# Patient Record
Sex: FEMALE | Race: BLACK OR AFRICAN AMERICAN | NOT HISPANIC OR LATINO | Employment: FULL TIME | URBAN - METROPOLITAN AREA
[De-identification: names, ages, dates, MRNs, and addresses within clinical notes are randomized per-mention and may not be internally consistent; named-entity substitution may affect disease eponyms.]

---

## 2020-05-04 ENCOUNTER — TELEPHONE (OUTPATIENT)
Dept: OBSTETRICS AND GYNECOLOGY | Facility: CLINIC | Age: 30
End: 2020-05-04

## 2020-05-04 NOTE — TELEPHONE ENCOUNTER
----- Message from Juliane Sutton sent at 5/4/2020  8:58 AM CDT -----  Contact: Patient   .Type:  Needs Medical Advice    Who Called:  Patient   Symptoms (please be specific):  Burning when urine    How long has patient had these symptoms:    Pharmacy name and phone #:    Would the patient rather a call back or a response via [facility]? call  Best Call Back Number:  [phone]  Additional Information: n/a

## 2020-05-04 NOTE — TELEPHONE ENCOUNTER
Patient called to report burning with urination and inflammation/irritation at the urethra. Took Macrobid in March for a UTI. Would like a Rx or appt. SH

## 2020-05-05 ENCOUNTER — OFFICE VISIT (OUTPATIENT)
Dept: OBSTETRICS AND GYNECOLOGY | Facility: CLINIC | Age: 30
End: 2020-05-05
Payer: COMMERCIAL

## 2020-05-05 VITALS
WEIGHT: 168 LBS | SYSTOLIC BLOOD PRESSURE: 111 MMHG | BODY MASS INDEX: 28.68 KG/M2 | DIASTOLIC BLOOD PRESSURE: 77 MMHG | HEIGHT: 64 IN

## 2020-05-05 DIAGNOSIS — B37.31 YEAST VAGINITIS: ICD-10-CM

## 2020-05-05 DIAGNOSIS — R30.0 DYSURIA: Primary | ICD-10-CM

## 2020-05-05 DIAGNOSIS — R39.15 URGENCY OF URINATION: ICD-10-CM

## 2020-05-05 DIAGNOSIS — A60.09 HERPES GENITALIS IN WOMEN: ICD-10-CM

## 2020-05-05 DIAGNOSIS — N30.01 ACUTE CYSTITIS WITH HEMATURIA: ICD-10-CM

## 2020-05-05 PROBLEM — R35.0 URINARY FREQUENCY: Status: ACTIVE | Noted: 2020-05-05

## 2020-05-05 PROCEDURE — 99213 PR OFFICE/OUTPT VISIT, EST, LEVL III, 20-29 MIN: ICD-10-PCS | Mod: S$GLB,,, | Performed by: OBSTETRICS & GYNECOLOGY

## 2020-05-05 PROCEDURE — 3008F BODY MASS INDEX DOCD: CPT | Mod: CPTII,S$GLB,, | Performed by: OBSTETRICS & GYNECOLOGY

## 2020-05-05 PROCEDURE — 3008F PR BODY MASS INDEX (BMI) DOCUMENTED: ICD-10-PCS | Mod: CPTII,S$GLB,, | Performed by: OBSTETRICS & GYNECOLOGY

## 2020-05-05 PROCEDURE — 99213 OFFICE O/P EST LOW 20 MIN: CPT | Mod: S$GLB,,, | Performed by: OBSTETRICS & GYNECOLOGY

## 2020-05-05 RX ORDER — VALACYCLOVIR HYDROCHLORIDE 500 MG/1
500 TABLET, FILM COATED ORAL 2 TIMES DAILY
COMMUNITY
Start: 2020-03-31 | End: 2022-02-03 | Stop reason: SDUPTHER

## 2020-05-05 RX ORDER — FLUCONAZOLE 150 MG/1
150 TABLET ORAL EVERY OTHER DAY
Qty: 2 TABLET | Refills: 0 | Status: SHIPPED | OUTPATIENT
Start: 2020-05-05 | End: 2020-05-08

## 2020-05-05 RX ORDER — NITROFURANTOIN 25; 75 MG/1; MG/1
100 CAPSULE ORAL 2 TIMES DAILY
Qty: 14 CAPSULE | Refills: 0 | Status: SHIPPED | OUTPATIENT
Start: 2020-05-05 | End: 2020-05-12

## 2020-05-05 RX ORDER — PHENAZOPYRIDINE HYDROCHLORIDE 200 MG/1
200 TABLET, FILM COATED ORAL 3 TIMES DAILY PRN
Qty: 20 TABLET | Refills: 0 | Status: SHIPPED | OUTPATIENT
Start: 2020-05-05 | End: 2020-08-10

## 2020-05-05 NOTE — PROGRESS NOTES
Subjective:       Patient ID: April Feldman is a 29 y.o. female.    Chief Complaint:  Urinary Tract Infection? Yeast? Herpes? (with unusual feeling at vaginal opening) and Pelvic Pain    History of Present Illness:  Patient c/o burning, stinging type pain x 2-3 days that keeps getting worse and is not sure if it is yeast or a herpes outbreak or her bladder.  Now with some dysuria but unable to tell if it is because urine hitting something externally that burns or burning on the way out.  Does report urinary frequency and urgency that kept her up most of last night and when she urinates, she only goes a small amount. Has not noticed any vaginal discharge but does notice that even when she is not urinating, there still is a strange uncomfortable sensationshe is feeling that is hard to describe. No recent illnesses or changes in medications. Only takes valtrex prn and has not need in a while. Also takes showers only    Past Medical History:   Diagnosis Date    Anxiety     Depression     Diabetes mellitus     Herpes simplex virus (HSV) infection     genital    PCOS (polycystic ovarian syndrome)     Pre-diabetes     Sickle cell anemia     Sickle cell trait     Tobacco abuse        Past Surgical History:   Procedure Laterality Date     SECTION         Social History     Tobacco Use    Smoking status: Current Every Day Smoker     Types: Cigars   Substance Use Topics    Alcohol use: Yes     Frequency: 2-3 times a week     Drinks per session: 1 or 2     Binge frequency: Never    Drug use: Never       Family History   Problem Relation Age of Onset    Colon cancer Paternal Grandfather     Lung cancer Paternal Grandmother     Diabetes Paternal Grandmother     Pancreatic cancer Maternal Grandmother     Diabetes Maternal Grandmother     Diabetes Father     Sickle cell anemia Mother        Review of patient's allergies indicates:   Allergen Reactions    Pcn [penicillins]          Current  Outpatient Medications:     fluconazole (DIFLUCAN) 150 MG Tab, Take 1 tablet (150 mg total) by mouth every other day. for 2 doses, Disp: 2 tablet, Rfl: 0    nitrofurantoin, macrocrystal-monohydrate, (MACROBID) 100 MG capsule, Take 1 capsule (100 mg total) by mouth 2 (two) times daily. for 7 days, Disp: 14 capsule, Rfl: 0    phenazopyridine (PYRIDIUM) 200 MG tablet, Take 1 tablet (200 mg total) by mouth 3 (three) times daily as needed for Pain., Disp: 20 tablet, Rfl: 0    valACYclovir (VALTREX) 500 MG tablet, Take 500 mg by mouth 2 (two) times daily., Disp: , Rfl:     GYN & OB History  Patient's last menstrual period was 04/15/2020 (exact date).   Date of Last Pap: 3/6/2020-neg  Hx of STDs: no  Hx of abn paps: no    CONTRACEPTION: declines    VITALS:  Vitals:    20 1500   BP: 111/77     Body mass index is 28.84 kg/m².  OB History    Para Term  AB Living   1 1 1         SAB TAB Ectopic Multiple Live Births                  # Outcome Date GA Lbr Tanvir/2nd Weight Sex Delivery Anes PTL Lv   1 Term      CS-Unspec          Review of Systems  Review of Systems   Constitutional: Negative for activity change, appetite change, chills, fatigue, fever and unexpected weight change.   Respiratory: Negative for cough and shortness of breath.    Gastrointestinal: Positive for bloating. Negative for constipation, diarrhea, nausea and vomiting.   Genitourinary: Positive for dysuria, frequency, urgency and vaginal pain. Negative for bladder incontinence, decreased libido, dysmenorrhea, dyspareunia, flank pain, genital sores, hematuria, menorrhagia, menstrual problem, pelvic pain, vaginal bleeding, vaginal discharge, urinary incontinence, postcoital bleeding, postmenopausal bleeding, vaginal dryness and vaginal odor.   Musculoskeletal: Negative for arthralgias and leg pain.   Integumentary:  Negative for rash, acne and mole/lesion.   Neurological: Negative for headaches.   Hematological: Does not bruise/bleed  easily.   Psychiatric/Behavioral: Negative for depression. The patient is not nervous/anxious.    Breast: negative.            Objective:    Physical Exam:   Constitutional: She is oriented to person, place, and time. She appears well-developed and well-nourished. No distress.    HENT:   Head: Normocephalic and atraumatic.      Cardiovascular: Normal rate.     Pulmonary/Chest: Effort normal. No respiratory distress.        Abdominal: Soft. She exhibits no distension. There is no tenderness.     Genitourinary: Rectal exam shows no external hemorrhoid. Pelvic exam was performed with patient supine. Labial bartholins abnormal.There is no rash, tenderness, lesion or injury on the right labia. There is no rash, tenderness, lesion or injury on the left labia. Cervix is normal. There is erythema and tenderness in the vagina. No bleeding in the vagina. Vaginal discharge found. Labial bartholins normal.          Musculoskeletal: Moves all extremeties.      Lymphadenopathy:        Right: No inguinal adenopathy present.        Left: No inguinal adenopathy present.    Neurological: She is alert and oriented to person, place, and time.    Skin: Skin is warm and dry. No rash noted. She is not diaphoretic. No pallor.         *female chaperone present for exam    Assessment:        1. Dysuria    2. Acute cystitis with hematuria    3. Urgency of urination    4. Yeast vaginitis    5. Herpes genitalis in women-no obvious lesions but cant r/o early signs of outbreak      Dysuria  -     Urinalysis; Future; Expected date: 05/05/2020  -     Urine culture; Future; Expected date: 05/05/2020    Acute cystitis with hematuria  -     nitrofurantoin, macrocrystal-monohydrate, (MACROBID) 100 MG capsule; Take 1 capsule (100 mg total) by mouth 2 (two) times daily. for 7 days  Dispense: 14 capsule; Refill: 0    Urgency of urination  -     phenazopyridine (PYRIDIUM) 200 MG tablet; Take 1 tablet (200 mg total) by mouth 3 (three) times daily as needed  for Pain.  Dispense: 20 tablet; Refill: 0    Yeast vaginitis  -     fluconazole (DIFLUCAN) 150 MG Tab; Take 1 tablet (150 mg total) by mouth every other day. for 2 doses  Dispense: 2 tablet; Refill: 0    Herpes genitalis in women-   - no apparent lesions at this time and discussed with patient the option of starting bid valtrex x 3 days if she feels like an outbreak is starting          Plan:       Follow up if symptoms worsen or fail to improve.

## 2020-05-05 NOTE — TELEPHONE ENCOUNTER
----- Message from Michael Almanza sent at 5/5/2020  8:16 AM CDT -----  Contact: self [phone]  .Type:  Needs Medical Advice    Who Called: April Acosta  Symptoms (please be specific):vaginal inflammation/pain with urination   How long has patient had these symptoms:  3 days  Pharmacy name and phone #:    Would the patient rather a call back or a response via [facility]? Call back  Best Call Back Number: [phone]  Additional Information:

## 2020-05-06 LAB
AMORPH URATE CRY URNS QL MICRO: NEGATIVE
BACTERIA #/AREA URNS HPF: ABNORMAL /[HPF]
BILIRUB UR QL STRIP: NEGATIVE
CLARITY UR: ABNORMAL
COLOR UR: YELLOW
EPITHELIAL CELLS: ABNORMAL
GLUCOSE (UA): NEGATIVE MG/DL
KETONES UR QL STRIP: NEGATIVE MG/DL
LEUKOCYTE ESTERASE UR QL STRIP: ABNORMAL
MUCOUS THREADS URNS QL MICRO: NEGATIVE
NITRITE UR QL STRIP: NEGATIVE
OCCULT BLOOD: ABNORMAL
PH, URINE: 5 (ref 5–7.5)
PROT UR QL STRIP: NEGATIVE MG/DL
RBC/HPF: ABNORMAL
SP GR UR STRIP: 1.02 (ref 1–1.03)
UROBILINOGEN, URINE: NORMAL E.U./DL (ref 0–1)
WBC/HPF: ABNORMAL

## 2020-05-19 DIAGNOSIS — B37.31 YEAST VAGINITIS: Primary | ICD-10-CM

## 2020-05-19 RX ORDER — FLUCONAZOLE 150 MG/1
150 TABLET ORAL EVERY OTHER DAY
Qty: 2 TABLET | Refills: 0 | Status: SHIPPED | OUTPATIENT
Start: 2020-05-19 | End: 2020-05-22

## 2020-08-07 ENCOUNTER — TELEPHONE (OUTPATIENT)
Dept: OBSTETRICS AND GYNECOLOGY | Facility: CLINIC | Age: 30
End: 2020-08-07

## 2020-08-07 DIAGNOSIS — R30.0 DYSURIA: Primary | ICD-10-CM

## 2020-08-07 DIAGNOSIS — N30.01 ACUTE CYSTITIS WITH HEMATURIA: ICD-10-CM

## 2020-08-07 RX ORDER — MONTELUKAST SODIUM 10 MG/1
10 TABLET ORAL NIGHTLY
COMMUNITY
End: 2020-08-10

## 2020-08-07 RX ORDER — NITROFURANTOIN 25; 75 MG/1; MG/1
100 CAPSULE ORAL 2 TIMES DAILY
Qty: 14 CAPSULE | Refills: 0 | Status: SHIPPED | OUTPATIENT
Start: 2020-08-07 | End: 2020-08-14

## 2020-08-07 NOTE — TELEPHONE ENCOUNTER
----- Message from Susan Maher sent at 8/7/2020 10:58 AM CDT -----  Regarding: patient advice  Contact: patient  Type:  Needs Medical Advice    Who Called: patient  Symptoms (please be specific): painful urination, possible yeast infection, spotting   How long has patient had these symptoms:  about 2 days  Pharmacy name and phone #:    [facility] [address] [facility]  [address] [facility] [address]  Phone: [phone] Fax: [phone]  Would the patient rather a call back or a response via [facility]? Call back  Best Call Back Number: [phone]  Additional Information: n/a

## 2020-08-07 NOTE — TELEPHONE ENCOUNTER
Please let patient know I am sending out abx for what sounds like a uti and if she gets that urine specimen to the lab, that will be helpful.  However, if she has a vaginal discharge, that probably needs to be evaluated in the office. A yeast infection can have burning but is not normally clear or having an odor.  Please have her come for swab sometime next week

## 2020-08-07 NOTE — TELEPHONE ENCOUNTER
"Pt called c/o dysuria "feels like knives". Also vaginal odor with a clear liquid discharge. Orders for u/a and urine culture sent to [facility]. PCN allergy, uses [facility] on [address]. Fwd to Dr. Lopez for review. Pt.verb.understanding.  "

## 2020-08-07 NOTE — TELEPHONE ENCOUNTER
Spoke with patient. Two pt identifiers confirmed. Notified patient of Dr. Lopez's rec that she come in to evaluate her vag.discharge. apt.scheduled 8/10/2020. Dr. Lopez sent out an abx for her uti. Patient verbalized understanding.

## 2020-08-09 NOTE — PROGRESS NOTES
"CC: PROBLEM VISIT-    HPI:  Patient is a 30 y.o.   who presents for a problem visit today.  History reviewed with patient.      She was seen previously in may 2020 for what was a uti/yeast infection but no hsv lesions seen. Pt treated with macrobid/diflucan and had valtrex to take if she chose.    She then called 3 days ago with dysuria symptoms and a vag d/c w/odor.  She did a urine cx which grew Ecoli but sensitivities pending. She started on Macrobid and pyridium but needs eval today for her vaginal d/c    Her uti is much improved but still has some white/clear vaginal discharge off and on and it has an odor and it a little irritating. She denies any itching or thick yeast like discharge.  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        Past Medical History:   Diagnosis Date    Anxiety and depression     HSV-2 infection     genital    PCOS (polycystic ovarian syndrome)     Pre-diabetes     Sickle cell trait     Tobacco abuse        Past Surgical History:   Procedure Laterality Date     SECTION         Social History     Tobacco Use    Smoking status: Current Every Day Smoker     Types: Cigars    Smokeless tobacco: Never Used   Substance Use Topics    Alcohol use: Yes     Alcohol/week: 5.0 standard drinks     Types: 5 Glasses of wine per week     Frequency: 2-3 times a week     Drinks per session: 1 or 2     Binge frequency: Never     Comment: weekly    Drug use: Never       Family History   Problem Relation Age of Onset    Colon cancer Paternal Grandfather     Lung cancer Paternal Grandmother     Diabetes Paternal Grandmother     Pancreatic cancer Maternal Grandmother     Diabetes Maternal Grandmother     Diabetes Father     Sickle cell trait Father     Sickle cell anemia Mother     Breast cancer Other         great great  maternal aunt    Lupus Maternal Aunt  "       Review of patient's allergies indicates:   Allergen Reactions    Pcn [penicillins]          Current Outpatient Medications:     nitrofurantoin, macrocrystal-monohydrate, (MACROBID) 100 MG capsule, Take 1 capsule (100 mg total) by mouth 2 (two) times daily. for 7 days, Disp: 14 capsule, Rfl: 0    phenazopyridine (PYRIDIUM) 200 MG tablet, Take 200 mg by mouth 3 (three) times daily as needed for Pain., Disp: , Rfl:     valACYclovir (VALTREX) 500 MG tablet, Take 500 mg by mouth 2 (two) times daily., Disp: , Rfl:     OB History        1    Para   1    Term   1            AB        Living   1       SAB        TAB        Ectopic        Multiple        Live Births   1                  GYN HX:    HX ABNL PAPS:  no abnormals    HX OF STDS:  genital herpes    GARDASIL: no- declines     CONTRACEPTION:  none- declines      HEALTH MAINTENANCE:  (PCP-Robinson Melgoza MD)    LAST ANNUAL/ PAP:   2020       LAST PAP:  neg     ROS:  Review of Systems   Constitutional: Negative for activity change, chills, fatigue, fever and unexpected weight change.   Respiratory: Negative for cough and shortness of breath.    Cardiovascular: Negative for chest pain and leg swelling.   Gastrointestinal: Negative for abdominal pain, bloating, blood in stool, constipation, diarrhea, nausea and vomiting.   Endocrine: Negative for hair loss and hot flashes.   Genitourinary: Positive for dysuria (much improved), frequency (much improved), urgency (much improved), vaginal discharge and vaginal odor.   Musculoskeletal: Negative for arthralgias and joint swelling.   Integumentary:  Negative for rash, acne and mole/lesion.   Neurological: Negative for headaches.   Hematological: Does not bruise/bleed easily.   Psychiatric/Behavioral: Negative for depression and sleep disturbance.   Breast: negative.        VITALS:  Patient's last menstrual period was 07/10/2020 (exact date).  Vitals:    08/10/20 0924   BP: (!) 104/55   Pulse:  "103   Weight: 77.6 kg (171 lb)   Height: 5' 4" (1.626 m)     Body mass index is 29.35 kg/m².     PHYSICAL EXAM:  Physical Exam:   Constitutional: She is oriented to person, place, and time. She appears well-developed and well-nourished. She is cooperative.       Cardiovascular: Normal rate.     Pulmonary/Chest: Effort normal. No respiratory distress.        Abdominal: Soft. Normal appearance. She exhibits no distension. There is no abdominal tenderness.     Genitourinary:    Uterus normal.      Pelvic exam was performed with patient supine.   There is no rash, tenderness or lesion on the right labia. There is no rash, tenderness or lesion on the left labia. Uterus is not enlarged and not tender. Cervix is normal. Right adnexum displays no mass, no tenderness and no fullness. Left adnexum displays no mass, no tenderness and no fullness. No erythema, tenderness, bleeding, rectocele, cystocele or unspecified prolapse of vaginal walls in the vagina.    No foreign body in the vagina.      No signs of injury in the vagina.   Labial bartholins normal.Cervix exhibits no motion tenderness, no discharge and no friability. positive for vaginal discharge          Musculoskeletal: Moves all extremeties.       Neurological: She is alert and oriented to person, place, and time.    Skin: Skin is warm and dry. No rash noted.    Psychiatric: She has a normal mood and affect. Her speech is normal and behavior is normal. Judgment and thought content normal.     *female chaperone present for exam    ASSESSMENT and PLAN:  Acute vaginitis-One swab done and will call with results     Acute cystitis without hematuria-finish all antibiotics      FOLLOWUP:  Follow up for wwe.     COUNSELING:  Patient was counseled today on A.C.S. Pap guidelines and recommendations for yearly pelvic exam, monthly self breast exams, contraception, and annual mammograms,starting at age 40. Encouraged patient to see her PCP for other health maintenance.       "

## 2020-08-10 ENCOUNTER — OFFICE VISIT (OUTPATIENT)
Dept: OBSTETRICS AND GYNECOLOGY | Facility: CLINIC | Age: 30
End: 2020-08-10
Payer: COMMERCIAL

## 2020-08-10 VITALS
HEART RATE: 103 BPM | WEIGHT: 171 LBS | HEIGHT: 64 IN | BODY MASS INDEX: 29.19 KG/M2 | SYSTOLIC BLOOD PRESSURE: 104 MMHG | DIASTOLIC BLOOD PRESSURE: 55 MMHG

## 2020-08-10 DIAGNOSIS — N76.0 ACUTE VAGINITIS: Primary | ICD-10-CM

## 2020-08-10 DIAGNOSIS — N30.00 ACUTE CYSTITIS WITHOUT HEMATURIA: ICD-10-CM

## 2020-08-10 LAB
AMORPH URATE CRY URNS QL MICRO: NEGATIVE
BACTERIA #/AREA URNS HPF: NEGATIVE /[HPF]
BILIRUB UR QL STRIP: NEGATIVE
CLARITY UR: ABNORMAL
COLOR UR: YELLOW
EPITHELIAL CELLS: ABNORMAL
GLUCOSE (UA): NEGATIVE MG/DL
KETONES UR QL STRIP: NEGATIVE MG/DL
LEUKOCYTE ESTERASE UR QL STRIP: ABNORMAL
MUCOUS THREADS URNS QL MICRO: ABNORMAL
NITRITE UR QL STRIP: NEGATIVE
OCCULT BLOOD: ABNORMAL
PH, URINE: 6.5 (ref 5–7.5)
PROT UR QL STRIP: 15 MG/DL
RBC/HPF: ABNORMAL
SP GR UR STRIP: 1.01 (ref 1–1.03)
URINE CULTURE, ROUTINE: NORMAL
UROBILINOGEN, URINE: 1 E.U./DL (ref 0–1)
WBC/HPF: ABNORMAL

## 2020-08-10 PROCEDURE — 3008F BODY MASS INDEX DOCD: CPT | Mod: CPTII,S$GLB,, | Performed by: OBSTETRICS & GYNECOLOGY

## 2020-08-10 PROCEDURE — 99213 PR OFFICE/OUTPT VISIT, EST, LEVL III, 20-29 MIN: ICD-10-PCS | Mod: S$GLB,,, | Performed by: OBSTETRICS & GYNECOLOGY

## 2020-08-10 PROCEDURE — 3008F PR BODY MASS INDEX (BMI) DOCUMENTED: ICD-10-PCS | Mod: CPTII,S$GLB,, | Performed by: OBSTETRICS & GYNECOLOGY

## 2020-08-10 PROCEDURE — 99213 OFFICE O/P EST LOW 20 MIN: CPT | Mod: S$GLB,,, | Performed by: OBSTETRICS & GYNECOLOGY

## 2020-08-10 RX ORDER — PHENAZOPYRIDINE HYDROCHLORIDE 200 MG/1
200 TABLET, FILM COATED ORAL 3 TIMES DAILY PRN
COMMUNITY
End: 2021-03-10

## 2020-08-12 NOTE — PROGRESS NOTES
Please let patient know her urine cx grew out E coli but it is sensitive to the macrobid she is taking. Make sure she finishes all 7 days. Are her symptoms better?

## 2020-08-13 ENCOUNTER — TELEPHONE (OUTPATIENT)
Dept: OBSTETRICS AND GYNECOLOGY | Facility: CLINIC | Age: 30
End: 2020-08-13

## 2020-08-13 NOTE — TELEPHONE ENCOUNTER
Spoke with patient. Two pt identifiers confirmed. Notified patient of results of urine culture, and be sure to finish all 7 days of abx. Pt. States symptoms are better. Advised if they don't completely resolve to call office. Patient verbalized understanding.

## 2020-08-13 NOTE — TELEPHONE ENCOUNTER
----- Message from Nicolle Lopez MD sent at 8/12/2020  1:38 PM CDT -----  Please let patient know her urine cx grew out E coli but it is sensitive to the macrobid she is taking. Make sure she finishes all 7 days. Are her symptoms better?

## 2020-09-18 ENCOUNTER — TELEPHONE (OUTPATIENT)
Dept: OBSTETRICS AND GYNECOLOGY | Facility: CLINIC | Age: 30
End: 2020-09-18

## 2020-09-18 NOTE — TELEPHONE ENCOUNTER
Please call  Pt and tell her that the oneswab we did on 8/10/20- we have a verbal report on and is pos for GBBS. So if she is still symptomatic, I can send out 5 days of Levaquin for her but  Let me know what pharmacy she wants

## 2020-09-21 ENCOUNTER — TELEPHONE (OUTPATIENT)
Dept: OBSTETRICS AND GYNECOLOGY | Facility: CLINIC | Age: 30
End: 2020-09-21

## 2020-09-21 DIAGNOSIS — N76.0 ACUTE VAGINITIS: Primary | ICD-10-CM

## 2020-09-21 RX ORDER — LEVOFLOXACIN 500 MG/1
500 TABLET, FILM COATED ORAL DAILY
Qty: 5 TABLET | Refills: 0 | Status: SHIPPED | OUTPATIENT
Start: 2020-09-21 | End: 2020-09-26

## 2020-09-21 NOTE — TELEPHONE ENCOUNTER
Spoke with patient. Two pt identifiers confirmed. Notified patient of results of cultures. Pt. States no symptoms but does want rx of abx. PCN allergy and wants to use [facility] in [place] on [address] [facility]. [facility] Dr. Lopez will send rx. Patient verbalized understanding.

## 2020-09-21 NOTE — TELEPHONE ENCOUNTER
----- Message from Susan Maher sent at 9/18/2020  1:23 PM CDT -----  Regarding: patient returning a call  Contact: patient  Type:  Patient Returning Call    Who Called:patient  Who Left Message for Patient:nurse  Does the patient know what this is regarding?:yes,results  Would the patient rather a call back or a response via [facility]? Call back  Best Call Back Number:[phone]   Additional Information: n/a

## 2021-03-10 PROBLEM — R35.0 URINARY FREQUENCY: Status: RESOLVED | Noted: 2020-05-05 | Resolved: 2021-03-10

## 2021-03-10 PROBLEM — N30.01 ACUTE CYSTITIS WITH HEMATURIA: Status: RESOLVED | Noted: 2020-05-05 | Resolved: 2021-03-10

## 2021-03-10 PROBLEM — B37.31 YEAST VAGINITIS: Status: RESOLVED | Noted: 2020-05-05 | Resolved: 2021-03-10

## 2021-03-10 PROBLEM — R30.0 DYSURIA: Status: RESOLVED | Noted: 2020-05-05 | Resolved: 2021-03-10

## 2021-03-10 PROBLEM — R39.15 URGENCY OF URINATION: Status: RESOLVED | Noted: 2020-05-05 | Resolved: 2021-03-10

## 2021-03-11 ENCOUNTER — OFFICE VISIT (OUTPATIENT)
Dept: OBSTETRICS AND GYNECOLOGY | Facility: CLINIC | Age: 31
End: 2021-03-11
Payer: COMMERCIAL

## 2021-03-11 ENCOUNTER — PROCEDURE VISIT (OUTPATIENT)
Dept: OBSTETRICS AND GYNECOLOGY | Facility: CLINIC | Age: 31
End: 2021-03-11
Payer: COMMERCIAL

## 2021-03-11 VITALS
DIASTOLIC BLOOD PRESSURE: 67 MMHG | SYSTOLIC BLOOD PRESSURE: 99 MMHG | BODY MASS INDEX: 28.24 KG/M2 | HEART RATE: 81 BPM | WEIGHT: 165.38 LBS | HEIGHT: 64 IN

## 2021-03-11 DIAGNOSIS — R10.2 PAIN, PELVIC, FEMALE: Primary | ICD-10-CM

## 2021-03-11 DIAGNOSIS — R10.2 PAIN, PELVIC, FEMALE: ICD-10-CM

## 2021-03-11 DIAGNOSIS — R10.2 PELVIC PAIN: ICD-10-CM

## 2021-03-11 DIAGNOSIS — Z01.419 ENCOUNTER FOR WELL WOMAN EXAM WITH ROUTINE GYNECOLOGICAL EXAM: Primary | ICD-10-CM

## 2021-03-11 DIAGNOSIS — N92.6 IRREGULAR PERIODS: ICD-10-CM

## 2021-03-11 PROCEDURE — 99213 PR OFFICE/OUTPT VISIT, EST, LEVL III, 20-29 MIN: ICD-10-PCS | Mod: 25,S$GLB,, | Performed by: OBSTETRICS & GYNECOLOGY

## 2021-03-11 PROCEDURE — 76830 TRANSVAGINAL US NON-OB: CPT | Mod: S$GLB,,, | Performed by: OBSTETRICS & GYNECOLOGY

## 2021-03-11 PROCEDURE — 1126F PR PAIN SEVERITY QUANTIFIED, NO PAIN PRESENT: ICD-10-PCS | Mod: S$GLB,,, | Performed by: OBSTETRICS & GYNECOLOGY

## 2021-03-11 PROCEDURE — 3008F BODY MASS INDEX DOCD: CPT | Mod: CPTII,S$GLB,, | Performed by: OBSTETRICS & GYNECOLOGY

## 2021-03-11 PROCEDURE — 76830 PR  ECHOGRAPHY,TRANSVAGINAL: ICD-10-PCS | Mod: S$GLB,,, | Performed by: OBSTETRICS & GYNECOLOGY

## 2021-03-11 PROCEDURE — 99213 OFFICE O/P EST LOW 20 MIN: CPT | Mod: 25,S$GLB,, | Performed by: OBSTETRICS & GYNECOLOGY

## 2021-03-11 PROCEDURE — 99395 PREV VISIT EST AGE 18-39: CPT | Mod: S$GLB,,, | Performed by: OBSTETRICS & GYNECOLOGY

## 2021-03-11 PROCEDURE — 99395 PR PREVENTIVE VISIT,EST,18-39: ICD-10-PCS | Mod: S$GLB,,, | Performed by: OBSTETRICS & GYNECOLOGY

## 2021-03-11 PROCEDURE — 3008F PR BODY MASS INDEX (BMI) DOCUMENTED: ICD-10-PCS | Mod: CPTII,S$GLB,, | Performed by: OBSTETRICS & GYNECOLOGY

## 2021-03-11 PROCEDURE — 1126F AMNT PAIN NOTED NONE PRSNT: CPT | Mod: S$GLB,,, | Performed by: OBSTETRICS & GYNECOLOGY

## 2021-03-12 ENCOUNTER — TELEPHONE (OUTPATIENT)
Dept: OBSTETRICS AND GYNECOLOGY | Facility: CLINIC | Age: 31
End: 2021-03-12

## 2021-03-22 ENCOUNTER — TELEPHONE (OUTPATIENT)
Dept: OBSTETRICS AND GYNECOLOGY | Facility: CLINIC | Age: 31
End: 2021-03-22

## 2021-06-29 ENCOUNTER — OFFICE VISIT (OUTPATIENT)
Dept: OBSTETRICS AND GYNECOLOGY | Facility: CLINIC | Age: 31
End: 2021-06-29
Payer: COMMERCIAL

## 2021-06-29 VITALS
HEART RATE: 90 BPM | HEIGHT: 64 IN | WEIGHT: 172 LBS | DIASTOLIC BLOOD PRESSURE: 80 MMHG | BODY MASS INDEX: 29.37 KG/M2 | SYSTOLIC BLOOD PRESSURE: 119 MMHG

## 2021-06-29 DIAGNOSIS — E28.2 PCOS (POLYCYSTIC OVARIAN SYNDROME): ICD-10-CM

## 2021-06-29 DIAGNOSIS — R10.2 PELVIC PAIN: ICD-10-CM

## 2021-06-29 DIAGNOSIS — N92.6 MENSES, IRREGULAR: Primary | ICD-10-CM

## 2021-06-29 DIAGNOSIS — B37.31 YEAST VAGINITIS: ICD-10-CM

## 2021-06-29 PROCEDURE — 99214 PR OFFICE/OUTPT VISIT, EST, LEVL IV, 30-39 MIN: ICD-10-PCS | Mod: S$GLB,,, | Performed by: OBSTETRICS & GYNECOLOGY

## 2021-06-29 PROCEDURE — 3008F BODY MASS INDEX DOCD: CPT | Mod: CPTII,S$GLB,, | Performed by: OBSTETRICS & GYNECOLOGY

## 2021-06-29 PROCEDURE — 99214 OFFICE O/P EST MOD 30 MIN: CPT | Mod: S$GLB,,, | Performed by: OBSTETRICS & GYNECOLOGY

## 2021-06-29 PROCEDURE — 3008F PR BODY MASS INDEX (BMI) DOCUMENTED: ICD-10-PCS | Mod: CPTII,S$GLB,, | Performed by: OBSTETRICS & GYNECOLOGY

## 2021-06-29 RX ORDER — FLUCONAZOLE 150 MG/1
150 TABLET ORAL EVERY OTHER DAY
Qty: 2 TABLET | Refills: 0 | Status: SHIPPED | OUTPATIENT
Start: 2021-06-29 | End: 2021-07-02

## 2021-06-30 ENCOUNTER — PROCEDURE VISIT (OUTPATIENT)
Dept: OBSTETRICS AND GYNECOLOGY | Facility: CLINIC | Age: 31
End: 2021-06-30
Payer: COMMERCIAL

## 2021-06-30 DIAGNOSIS — R10.2 PELVIC PAIN: ICD-10-CM

## 2021-06-30 LAB
ABS NRBC COUNT: 0 X 10 3/UL (ref 0–0.01)
ABSOLUTE BASOPHIL: 0.04 X 10 3/UL (ref 0–0.22)
ABSOLUTE EOSINOPHIL: 0.19 X 10 3/UL (ref 0.04–0.54)
ABSOLUTE IMMATURE GRAN: 0.02 X 10 3/UL (ref 0–0.04)
ABSOLUTE LYMPHOCYTE: 1.83 X 10 3/UL (ref 0.86–4.75)
ABSOLUTE MONOCYTE: 0.51 X 10 3/UL (ref 0.22–1.08)
ALBUMIN SERPL-MCNC: 4.3 G/DL (ref 3.5–5.2)
ALBUMIN/GLOB SERPL ELPH: 1.6 {RATIO} (ref 1–2.7)
ALP ISOS SERPL LEV INH-CCNC: 72 U/L (ref 35–105)
ALT (SGPT): 18 U/L (ref 0–33)
ANION GAP SERPL CALC-SCNC: 6 MMOL/L (ref 8–17)
AST SERPL-CCNC: 21 U/L (ref 0–32)
B-HCG SERPL-ACNC: <1.59 MIU/ML
BASOPHILS NFR BLD: 0.8 % (ref 0.2–1.2)
BILIRUBIN, TOTAL: 0.3 MG/DL (ref 0–1.2)
BUN/CREAT SERPL: 13.6 (ref 6–20)
CALCIUM SERPL-MCNC: 9.7 MG/DL (ref 8.6–10.2)
CARBON DIOXIDE, CO2: 29 MMOL/L (ref 22–29)
CHLORIDE: 106 MMOL/L (ref 98–107)
CREAT SERPL-MCNC: 0.81 MG/DL (ref 0.5–0.9)
DHEA SULFATE: 483 UG/DL (ref 98.8–340)
EOSINOPHIL NFR BLD: 3.8 % (ref 0.7–7)
ESTIMATED AVERAGE GLUCOSE: 120 MG/DL
FREE TESTOSTERONE: 1.93 NG/DL (ref 0–1)
FSH: 5.42 MIU/ML
GFR ESTIMATION: 83.02
GLOBULIN: 2.7 G/DL (ref 1.5–4.5)
GLUCOSE: 120 MG/DL (ref 74–106)
HBA1C MFR BLD: 5.8 % (ref 4–6)
HCT VFR BLD AUTO: 40.7 % (ref 37–47)
HGB BLD-MCNC: 14.1 G/DL (ref 12–16)
IMMATURE GRANULOCYTES: 0.4 % (ref 0–0.5)
INSULIN AB SER QL: 20.4 UIU/ML (ref 2.6–24.9)
LH: 10.7 MIU/ML
LYMPHOCYTES NFR BLD: 36.3 % (ref 19.3–53.1)
MCH RBC QN AUTO: 32.3 PG (ref 27–32)
MCHC RBC AUTO-ENTMCNC: 34.6 G/DL (ref 32–36)
MCV RBC AUTO: 93.3 FL (ref 82–100)
MONOCYTES NFR BLD: 10.1 % (ref 4.7–12.5)
NEUTROPHILS # BLD AUTO: 2.45 X 10 3/UL (ref 2.15–7.56)
NEUTROPHILS NFR BLD: 48.6 %
NUCLEATED RED BLOOD CELLS: 0 /100 WBC (ref 0–0.2)
PLATELET # BLD AUTO: 315 X 10 3/UL (ref 135–400)
POTASSIUM: 4.7 MMOL/L (ref 3.5–5.1)
PROLACTIN SERPL-MCNC: 9.41 NG/ML (ref 4.79–23.3)
PROT SNV-MCNC: 7 G/DL (ref 6.4–8.3)
RBC # BLD AUTO: 4.36 X 10 6/UL (ref 4.2–5.4)
RDW-SD: 46.7 FL (ref 37–54)
SODIUM: 141 MMOL/L (ref 136–145)
T4, FREE: 1.38 NG/DL (ref 0.93–1.7)
TSH SERPL DL<=0.005 MIU/L-ACNC: 1.37 UIU/ML (ref 0.27–4.2)
UREA NITROGEN (BUN): 11 MG/DL (ref 6–20)
WBC # BLD: 5.04 X 10 3/UL (ref 4.3–10.8)

## 2021-06-30 PROCEDURE — 76856 PR  ECHO,PELVIC (NONOBSTETRIC): ICD-10-PCS | Mod: S$GLB,,, | Performed by: OBSTETRICS & GYNECOLOGY

## 2021-06-30 PROCEDURE — 76856 US EXAM PELVIC COMPLETE: CPT | Mod: S$GLB,,, | Performed by: OBSTETRICS & GYNECOLOGY

## 2021-07-02 ENCOUNTER — TELEPHONE (OUTPATIENT)
Dept: OBSTETRICS AND GYNECOLOGY | Facility: CLINIC | Age: 31
End: 2021-07-02

## 2021-07-02 DIAGNOSIS — R79.89 ELEVATED DEHYDROEPIANDROSTERONE (DHEA) LEVEL: ICD-10-CM

## 2021-07-02 DIAGNOSIS — R79.89 ELEVATED TESTOSTERONE LEVEL IN FEMALE: Primary | ICD-10-CM

## 2021-07-06 ENCOUNTER — TELEPHONE (OUTPATIENT)
Dept: OBSTETRICS AND GYNECOLOGY | Facility: CLINIC | Age: 31
End: 2021-07-06

## 2021-07-06 ENCOUNTER — PATIENT MESSAGE (OUTPATIENT)
Dept: OBSTETRICS AND GYNECOLOGY | Facility: CLINIC | Age: 31
End: 2021-07-06

## 2021-07-08 ENCOUNTER — PATIENT MESSAGE (OUTPATIENT)
Dept: OBSTETRICS AND GYNECOLOGY | Facility: CLINIC | Age: 31
End: 2021-07-08

## 2021-07-08 ENCOUNTER — TELEPHONE (OUTPATIENT)
Dept: OBSTETRICS AND GYNECOLOGY | Facility: CLINIC | Age: 31
End: 2021-07-08

## 2021-07-08 DIAGNOSIS — N76.0 ACUTE VAGINITIS: Primary | ICD-10-CM

## 2021-07-08 RX ORDER — LEVOFLOXACIN 500 MG/1
500 TABLET, FILM COATED ORAL DAILY
Qty: 5 TABLET | Refills: 0 | Status: SHIPPED | OUTPATIENT
Start: 2021-07-08 | End: 2021-07-13

## 2021-12-02 ENCOUNTER — OFFICE VISIT (OUTPATIENT)
Dept: OBSTETRICS AND GYNECOLOGY | Facility: CLINIC | Age: 31
End: 2021-12-02
Payer: MEDICAID

## 2021-12-02 VITALS
HEIGHT: 64 IN | DIASTOLIC BLOOD PRESSURE: 79 MMHG | SYSTOLIC BLOOD PRESSURE: 117 MMHG | BODY MASS INDEX: 29.5 KG/M2 | WEIGHT: 172.81 LBS | HEART RATE: 97 BPM

## 2021-12-02 DIAGNOSIS — L73.9 FOLLICULITIS: Primary | ICD-10-CM

## 2021-12-02 PROBLEM — R79.89 ELEVATED TESTOSTERONE LEVEL IN FEMALE: Status: ACTIVE | Noted: 2021-01-01

## 2021-12-02 PROCEDURE — 99213 OFFICE O/P EST LOW 20 MIN: CPT | Mod: S$GLB,,, | Performed by: OBSTETRICS & GYNECOLOGY

## 2021-12-02 PROCEDURE — 99213 PR OFFICE/OUTPT VISIT, EST, LEVL III, 20-29 MIN: ICD-10-PCS | Mod: S$GLB,,, | Performed by: OBSTETRICS & GYNECOLOGY

## 2021-12-02 RX ORDER — SULFAMETHOXAZOLE AND TRIMETHOPRIM 800; 160 MG/1; MG/1
1 TABLET ORAL 2 TIMES DAILY
Qty: 14 TABLET | Refills: 0 | Status: SHIPPED | OUTPATIENT
Start: 2021-12-02 | End: 2021-12-09

## 2021-12-02 RX ORDER — METFORMIN HYDROCHLORIDE 500 MG/1
TABLET, EXTENDED RELEASE ORAL
COMMUNITY
Start: 2021-11-01 | End: 2023-07-05 | Stop reason: SDUPTHER

## 2022-02-02 ENCOUNTER — PATIENT MESSAGE (OUTPATIENT)
Dept: OBSTETRICS AND GYNECOLOGY | Facility: CLINIC | Age: 32
End: 2022-02-02
Payer: MEDICAID

## 2022-02-03 RX ORDER — VALACYCLOVIR HYDROCHLORIDE 500 MG/1
500 TABLET, FILM COATED ORAL 2 TIMES DAILY
Qty: 10 TABLET | Refills: 3 | Status: SHIPPED | OUTPATIENT
Start: 2022-02-03 | End: 2022-03-16 | Stop reason: SDUPTHER

## 2022-02-28 RX ORDER — FLUCONAZOLE 150 MG/1
150 TABLET ORAL EVERY OTHER DAY
COMMUNITY
Start: 2022-02-28 | End: 2022-02-28 | Stop reason: SDUPTHER

## 2022-02-28 NOTE — TELEPHONE ENCOUNTER
----- Message from Latoya Mcdowell sent at 2/28/2022  8:06 AM CST -----  Regarding: Pt advice  Contact: Pt  Type:  Needs Medical Advice    Who Called:  Yina Feldman   Symptoms (please be specific):  jelly like discharge w/blood, itching and irritation   How long has patient had these symptoms:  last week   Pharmacy name and phone #:       [facility] [address] [place], [place] - [address] [facility]  [address] [facility] [address]  Phone: [phone] Fax: [phone]    Would the patient rather a call back or a response via [facility]? Call back   Best Call Back Number:  [phone]  Additional Information:

## 2022-02-28 NOTE — TELEPHONE ENCOUNTER
Returned call to patient. She reports having vaginal itching, and using a vaginal suppository for yeast. She states is somewhat better, but would like something called out in case it doesn't completely go away. Request will be sent to Dr. Lopez for approval. Patient to contact our office if symptoms worsens or does not improve. Patient verbalized understanding.

## 2022-03-01 RX ORDER — FLUCONAZOLE 150 MG/1
150 TABLET ORAL EVERY OTHER DAY
Qty: 2 TABLET | Refills: 0 | Status: SHIPPED | OUTPATIENT
Start: 2022-03-01 | End: 2022-03-05

## 2022-03-02 ENCOUNTER — TELEPHONE (OUTPATIENT)
Dept: OBSTETRICS AND GYNECOLOGY | Facility: CLINIC | Age: 32
End: 2022-03-02
Payer: MEDICAID

## 2022-03-02 DIAGNOSIS — R30.0 DYSURIA: Primary | ICD-10-CM

## 2022-03-02 RX ORDER — NITROFURANTOIN 25; 75 MG/1; MG/1
100 CAPSULE ORAL
COMMUNITY
End: 2022-03-02 | Stop reason: SDUPTHER

## 2022-03-02 RX ORDER — NITROFURANTOIN 25; 75 MG/1; MG/1
100 CAPSULE ORAL EVERY 12 HOURS
Qty: 14 CAPSULE | Refills: 0 | Status: SHIPPED | OUTPATIENT
Start: 2022-03-02 | End: 2022-03-14

## 2022-03-02 NOTE — TELEPHONE ENCOUNTER
Spoke with patient. Two pt identifiers confirmed. Notified patient she needs to go to [facility] and do a urine and urine cx.will send orders. She has a PCN allergy and uses [facility] on [address]. Patient verbalized understanding.

## 2022-03-02 NOTE — TELEPHONE ENCOUNTER
Spoke with patient. Two pt identifiers confirmed. Notified patient med sent to pharmacy. Patient verbalized understanding.

## 2022-03-02 NOTE — TELEPHONE ENCOUNTER
----- Message from Mali Taveras sent at 3/2/2022 10:31 AM CST -----  April Feldman would like for the doctor to call her out some medication to help with painful urination. Please give her a call back for any additional questions [phone] (home)     [address] - [address] [facility]  [address] [facility] [address]  Phone: [phone] Fax: [phone]

## 2022-03-02 NOTE — TELEPHONE ENCOUNTER
Orders for ua, c&s sent to lab for dysuria. Dr. Lopez out of town but wants me to send a refill request to Dr. Lopez for macrobid 100mg bid x 7 days.

## 2022-03-07 ENCOUNTER — TELEPHONE (OUTPATIENT)
Dept: OBSTETRICS AND GYNECOLOGY | Facility: CLINIC | Age: 32
End: 2022-03-07
Payer: MEDICAID

## 2022-03-07 NOTE — TELEPHONE ENCOUNTER
Please call pt and let her know her ua wasn't a good clean catch and her urine cx just grew nonspecific bacteria likely from contamination from the skin. However we had started her on macrobid and I would recommend finishing all that and if symptoms are nor completely resolve, then let us know and we can recheck

## 2022-03-07 NOTE — TELEPHONE ENCOUNTER
Spoke with patient. Two pt identifiers confirmed. Notified patient of results of urine. Finish abx and call, office if symptoms not resolved. She states she is getting better.. Patient verbalized understanding.

## 2022-03-14 NOTE — PROGRESS NOTES
CC: WELL WOMAN (age 26-39)     Patient Care Team:  Robinson Melgoza MD as PCP - General (Family Medicine)  Cecilia Frederick MD (Endocrinology)    The patient's last visit with me was on 2021 for folliculitis    HPI:  Patient is a 31 y.o. who presents for her well woman exam today.  History reviewed with patient.   Patient is without complaints or concerns today. Had a uti a few weeks ago but symptoms resolved compltely and declines repeat ua.  Has been seeing dr Frederick and doing well on metformin. Periods have become regular every month now. Sees her again next month    Patient's last menstrual period was 2022 (exact date).      CONTRACEPTION: none and declines at this time  GARDASIL: x 2 in   HX ABNL PAPS: none    REVIEW OF DATA/ HEALTH MAINTENANCE  LAST ANNUAL/ PAP:   2021    LAST LABS- in the last year with specialist    Past Medical History:   Diagnosis Date    Anxiety and depression     Elevated DHEA     Elevated testosterone level in female     ref to endocrinology-shante    Folliculitis     History of UTI     HSV-2 infection     genital    Menses, irregular     PCOS (polycystic ovarian syndrome)     Pre-diabetes     Sickle cell trait      SURGICAL HX:   has a past surgical history that includes  section.    SOCIAL HX:   reports that she has been smoking cigars. She has never used smokeless tobacco. She reports current alcohol use of about 4.0 standard drinks of alcohol per week. She reports that she does not use drugs.    FAMILY HX:  family history includes Breast cancer in her other; Colon cancer in her paternal grandfather; Diabetes in her father, maternal grandmother, and paternal grandmother; Lung cancer in her paternal grandmother; Lupus in her maternal aunt; Pancreatic cancer in her maternal grandmother; Sickle cell anemia in her mother; Sickle cell trait in her father. .    ALLERGIES:  Pcn [penicillins]    Current Outpatient Medications:      "metFORMIN (GLUCOPHAGE-XR) 500 MG ER 24hr tablet, , Disp: , Rfl:     valACYclovir (VALTREX) 500 MG tablet, Take 1 tablet (500 mg total) by mouth 2 (two) times daily., Disp: 10 tablet, Rfl: 3    ROS:  CONST:  No fever, chills, fatigue or unexpected changes in weight  CV: No chest pain or palpitations  RESP:  No shortness of breath or cough  GI: No abd pain, vomiting, diarrhea, blood in stool, or changes in bowel mvmts  SKIN: No rashes or lesions  MUSCULOSKELETAL: No joint swelling or pain  PSYCH: No changes in mood or insomia  BREASTS: No asymmetry, lumps, pain, nipple discharge, or skin changes  :  No dysuria, urgency, frequency, hematuria or incontinence          No vag dc, itching, odor or dryness          No pelvic pain, dyspareunia, or abnormal vaginal bleeding    VITALS:  Blood pressure 112/76, pulse 79, height 5' 4" (1.626 m), weight 78.2 kg (172 lb 6.4 oz), last menstrual period 02/12/2022.  Body mass index is 29.59 kg/m².     PHYSICAL EXAM-  APPEARANCE: Well appearing, in no acute distress.  NECK: Neck symmetric without masses or thyromegaly.  CV:  Normal rate  PULM: Normal resp rate, no resp distress, normal resp effort  PSYCH: Normal mood and affect, cooperative  SKIN: No rashes, lesions, or abnormal bruising  LYMPH: No inguinal or axillary adenopathy  ABD: Soft, without tenderness or masses. No palpable hernias or hsm  BREASTS: Symmetrical, no skin changes or lesions. No palpable masses, tenderness, or nipple dc  PELVIC:  VULVA: No lesions. Normal female genitalia.  URETHRAL MEATUS: No masses, no prolapse.  BLADDER/ URETHRA: No masses or suprapubic tenderness  VAGINA: No discharge, erythema, or lesions. No significant cystocele or rectocele.   CVX: No lesions,no cervical motion tenderness   UTERUS: Normal size/shape, mobile, non-tender  ADNEXA: No masses, tenderness, or fullness.  ANUS/ PERINEUM: Normal tone.  No lesions.  *female chaperone present for entire exam    ASSESSMENT and PLAN:  Encounter for " well woman exam with routine gynecological exam  -     Liquid-based pap smear, screening    HSV-2 infection  -     valACYclovir (VALTREX) 500 MG tablet; Take 1 tablet (500 mg total) by mouth 2 (two) times daily.  Dispense: 10 tablet; Refill: 3       FOLLOWUP:  1 yr for wwe or sooner prn    COUNSELING:  Patient was counseled today on recommendation for yearly pelvic exams, current Pap guidelines, self breast exams, contraception, and recommendations for annual screening mammograms at age 40. Encouraged patient to see her PCP for other health maintenance.

## 2022-03-15 ENCOUNTER — OFFICE VISIT (OUTPATIENT)
Dept: OBSTETRICS AND GYNECOLOGY | Facility: CLINIC | Age: 32
End: 2022-03-15
Payer: MEDICAID

## 2022-03-15 VITALS
HEART RATE: 79 BPM | SYSTOLIC BLOOD PRESSURE: 112 MMHG | WEIGHT: 172.38 LBS | DIASTOLIC BLOOD PRESSURE: 76 MMHG | HEIGHT: 64 IN | BODY MASS INDEX: 29.43 KG/M2

## 2022-03-15 DIAGNOSIS — Z01.419 ENCOUNTER FOR WELL WOMAN EXAM WITH ROUTINE GYNECOLOGICAL EXAM: Primary | ICD-10-CM

## 2022-03-15 DIAGNOSIS — B00.9 HSV-2 INFECTION: ICD-10-CM

## 2022-03-15 PROCEDURE — 1159F PR MEDICATION LIST DOCUMENTED IN MEDICAL RECORD: ICD-10-PCS | Mod: CPTII,S$GLB,, | Performed by: OBSTETRICS & GYNECOLOGY

## 2022-03-15 PROCEDURE — 3078F PR MOST RECENT DIASTOLIC BLOOD PRESSURE < 80 MM HG: ICD-10-PCS | Mod: CPTII,S$GLB,, | Performed by: OBSTETRICS & GYNECOLOGY

## 2022-03-15 PROCEDURE — 3074F PR MOST RECENT SYSTOLIC BLOOD PRESSURE < 130 MM HG: ICD-10-PCS | Mod: CPTII,S$GLB,, | Performed by: OBSTETRICS & GYNECOLOGY

## 2022-03-15 PROCEDURE — 99395 PR PREVENTIVE VISIT,EST,18-39: ICD-10-PCS | Mod: S$GLB,,, | Performed by: OBSTETRICS & GYNECOLOGY

## 2022-03-15 PROCEDURE — 3074F SYST BP LT 130 MM HG: CPT | Mod: CPTII,S$GLB,, | Performed by: OBSTETRICS & GYNECOLOGY

## 2022-03-15 PROCEDURE — 3008F BODY MASS INDEX DOCD: CPT | Mod: CPTII,S$GLB,, | Performed by: OBSTETRICS & GYNECOLOGY

## 2022-03-15 PROCEDURE — 3078F DIAST BP <80 MM HG: CPT | Mod: CPTII,S$GLB,, | Performed by: OBSTETRICS & GYNECOLOGY

## 2022-03-15 PROCEDURE — 1160F PR REVIEW ALL MEDS BY PRESCRIBER/CLIN PHARMACIST DOCUMENTED: ICD-10-PCS | Mod: CPTII,S$GLB,, | Performed by: OBSTETRICS & GYNECOLOGY

## 2022-03-15 PROCEDURE — 99395 PREV VISIT EST AGE 18-39: CPT | Mod: S$GLB,,, | Performed by: OBSTETRICS & GYNECOLOGY

## 2022-03-15 PROCEDURE — 3008F PR BODY MASS INDEX (BMI) DOCUMENTED: ICD-10-PCS | Mod: CPTII,S$GLB,, | Performed by: OBSTETRICS & GYNECOLOGY

## 2022-03-15 PROCEDURE — 1159F MED LIST DOCD IN RCRD: CPT | Mod: CPTII,S$GLB,, | Performed by: OBSTETRICS & GYNECOLOGY

## 2022-03-15 PROCEDURE — 1160F RVW MEDS BY RX/DR IN RCRD: CPT | Mod: CPTII,S$GLB,, | Performed by: OBSTETRICS & GYNECOLOGY

## 2022-03-16 RX ORDER — VALACYCLOVIR HYDROCHLORIDE 500 MG/1
500 TABLET, FILM COATED ORAL 2 TIMES DAILY
Qty: 10 TABLET | Refills: 3 | Status: SHIPPED | OUTPATIENT
Start: 2022-03-16 | End: 2022-10-05 | Stop reason: SDUPTHER

## 2022-05-19 ENCOUNTER — PATIENT MESSAGE (OUTPATIENT)
Dept: OBSTETRICS AND GYNECOLOGY | Facility: CLINIC | Age: 32
End: 2022-05-19
Payer: MEDICAID

## 2022-08-23 ENCOUNTER — PATIENT MESSAGE (OUTPATIENT)
Dept: OBSTETRICS AND GYNECOLOGY | Facility: CLINIC | Age: 32
End: 2022-08-23
Payer: MEDICAID

## 2022-08-23 DIAGNOSIS — R30.0 DYSURIA: Primary | ICD-10-CM

## 2022-08-23 RX ORDER — NITROFURANTOIN 25; 75 MG/1; MG/1
100 CAPSULE ORAL 2 TIMES DAILY
Qty: 14 CAPSULE | Refills: 0 | Status: SHIPPED | OUTPATIENT
Start: 2022-08-23 | End: 2022-08-30

## 2022-08-25 LAB
AMORPH URATE CRY URNS QL MICRO: ABNORMAL
BACTERIA #/AREA URNS HPF: ABNORMAL /[HPF]
BILIRUB UR QL STRIP: NEGATIVE
CLARITY UR: ABNORMAL
COLOR UR: YELLOW
EPITHELIAL CELLS: ABNORMAL
GLUCOSE (UA): NEGATIVE MG/DL
KETONES UR QL STRIP: NEGATIVE MG/DL
LEUKOCYTE ESTERASE UR QL STRIP: ABNORMAL
MUCOUS THREADS URNS QL MICRO: NEGATIVE
NITRITE UR QL STRIP: NEGATIVE
OCCULT BLOOD: ABNORMAL
PH, URINE: 5 (ref 5–7.5)
PROT UR QL STRIP: NEGATIVE MG/DL
RBC/HPF: ABNORMAL
SP GR UR STRIP: 1.02 (ref 1–1.03)
URINE CULTURE, ROUTINE: NORMAL
UROBILINOGEN, URINE: NORMAL E.U./DL (ref 0–1)
WBC/HPF: ABNORMAL

## 2022-08-25 NOTE — PROGRESS NOTES
Please call pt and let her know that her urine cx grew out E coli and the antibiotic she is on should take care of it so make sure she finishes them all.

## 2023-01-02 ENCOUNTER — PATIENT MESSAGE (OUTPATIENT)
Dept: OBSTETRICS AND GYNECOLOGY | Facility: CLINIC | Age: 33
End: 2023-01-02
Payer: COMMERCIAL

## 2023-01-03 RX ORDER — FLUCONAZOLE 150 MG/1
150 TABLET ORAL DAILY
Qty: 2 TABLET | Refills: 0 | Status: SHIPPED | OUTPATIENT
Start: 2023-01-03 | End: 2023-01-04

## 2023-01-05 DIAGNOSIS — B00.9 HSV-2 INFECTION: Primary | ICD-10-CM

## 2023-01-05 RX ORDER — VALACYCLOVIR HYDROCHLORIDE 1 G/1
1000 TABLET, FILM COATED ORAL DAILY
Qty: 90 TABLET | Refills: 2 | Status: SHIPPED | OUTPATIENT
Start: 2023-01-05 | End: 2024-01-15 | Stop reason: SDUPTHER

## 2023-01-05 NOTE — TELEPHONE ENCOUNTER
April script for Valtrex is 1 tablet bid but only quantity of 10. Not sure if this is accurate or not. Please let me know and I will inform patient.

## 2023-03-21 RX ORDER — BUSPIRONE HYDROCHLORIDE 5 MG/1
5 TABLET ORAL 2 TIMES DAILY
COMMUNITY
Start: 2022-10-26 | End: 2023-03-22

## 2023-03-21 NOTE — PROGRESS NOTES
CC: WELL WOMAN (age 26-39)     Patient Care Team:  Cecilia Frederick MD (Endocrinology)    Last visit with me was on  3/15/2022 for wwe    HPI:  Patient is a 32 y.o. who presents for her well woman exam today.  History reviewed with patient.   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    SOCIAL HX:   reports that she has been smoking cigars. She has never used smokeless tobacco. She reports current alcohol use of about 4.0 standard drinks per week. She reports that she does not use drugs.    FAMILY HX:  family history includes Breast cancer in an other family member; Colon cancer in her paternal grandfather; Diabetes in her father, maternal grandmother, and paternal grandmother; Lung cancer in her paternal grandmother; Lupus in her maternal aunt; Pancreatic cancer in her maternal grandmother; Sickle cell anemia in her mother; Sickle cell trait in her father. .    ALLERGIES:  Pcn [penicillins]    Current Outpatient Medications   Medication Instructions    metFORMIN (GLUCOPHAGE-XR) 500 MG ER 24hr tablet No dose, route, or frequency recorded.    sulfamethoxazole-trimethoprim 800-160mg (BACTRIM DS) 800-160 mg Tab 1 tablet, Oral, 2 times daily    valACYclovir (VALTREX) 500 mg, Oral, 2 times daily     "valACYclovir (VALTREX) 1,000 mg, Oral, Daily     ROS:  CONST:  No fever, chills, fatigue or unexpected changes in weight   CV: No chest pain or palpitations   RESP:  No shortness of breath or cough   GI: No abd pain, vomiting, diarrhea, blood in stool, or changes in bowel mvmts   SKIN: No rashes or lesions  MUSCULOSKELETAL: No joint swelling or pain   PSYCH: No changes in mood or insomia   BREASTS: No asymmetry, lumps, pain, nipple discharge, or skin changes   :  No dysuria, urgency, frequency, hematuria or incontinence           No vag dc, itching, odor or dryness           No pelvic pain, dyspareunia, +irreg periods +lump on left side    VITALS:  Blood pressure 134/87, height 5' 4" (1.626 m), weight 77.1 kg (170 lb), last menstrual period 03/22/2023.  Body mass index is 29.18 kg/m².     PHYSICAL EXAM-  APPEARANCE: Well appearing, in no acute distress.   NECK: Neck symmetric.   CV:  Normal rate   PULM: Normal resp rate, no resp distress, normal resp effort    PSYCH: Normal mood and affect, cooperative   SKIN: No rashes, lesions, or abnormal bruising   LYMPH: No inguinal or axillary adenopathy   ABD: Soft, without tenderness or masses.   BREAST: Symmetrical, no nipple changes, no skin changes, No palpable masses   PELVIC:  VULVA: Normal female genitalia.+small 3-4mm area of chronic   URETHRAL MEATUS: No masses, no significant prolapse.   BLADDER/ URETHRA: No masses or suprapubic tenderness   VAGINA: No lesions or erythema, No discharge.   CVX: No lesions,no cervical motion tenderness.   UTERUS: Normal size/shape, mobile, non-tender   ADNEXA: No masses, tenderness, or fullness.   ANUS/ PERINEUM: Normal tone.  No lesions.     *female chaperone present for entire exam      ASSESSMENT and PLAN:  Encounter for well woman exam with routine gynecological exam  -     Liquid-based pap smear, screening    FOLLOWUP:  1 yr for wwe or sooner prn    COUNSELING:  Patient was counseled today on recommendation for yearly pelvic " exams, current Pap guidelines, self breast exams, contraception, and recommendations for annual screening mammograms at age 40. Encouraged patient to see her PCP for other health maintenance.    PROBLEM VISIT:  Problem HPI/ROS:            swollen area on left inguinal area off and on since nov sometimes tender but never went away  Trying to get pregnant x 3 yrs, hasnt tried opks but will this next cycle. Worried she is infertile and wants to find out if she can get pregnant. Her partner for 3 yrs has no other children and he is 32 yrs old and with no medical problems and on no meds. Cycles are consistently about every 40-41 days and have been that while for a long time.  No pain. Hx of pcos, elevated dheas, testosterone, insulin and on metformin but no longer wants to see endocrinology bc only wants to see a female and often has to see the male NPs. Discussed with pt that she can request to see the dr and she will consider that.      Problem PHYS EXAM:  (pertinent findings noted in PHYS EXAM above)     Problem ASSESMENT and PLAN:  Folliculitis  -     sulfamethoxazole-trimethoprim 800-160mg (BACTRIM DS) 800-160 mg Tab; Take 1 tablet by mouth 2 (two) times daily. for 7 days  Dispense: 14 tablet; Refill: 0    Menses, irregular  -     US OB/GYN Procedure ([facility]); Future; Expected date: 03/22/2023    PCOS (polycystic ovarian syndrome)    Pre-diabetes    Elevated testosterone level in female    Faxed request for shante's last note and labs to see what has been checked and then can chk any other pcos labs as well.     *Total time spent: 40 min. 50 % or more was spent on counseling about diagnosis, treatment options, and coordination of care.

## 2023-03-22 ENCOUNTER — OFFICE VISIT (OUTPATIENT)
Dept: OBSTETRICS AND GYNECOLOGY | Facility: CLINIC | Age: 33
End: 2023-03-22
Payer: COMMERCIAL

## 2023-03-22 VITALS
SYSTOLIC BLOOD PRESSURE: 134 MMHG | WEIGHT: 170 LBS | HEIGHT: 64 IN | DIASTOLIC BLOOD PRESSURE: 87 MMHG | BODY MASS INDEX: 29.02 KG/M2

## 2023-03-22 DIAGNOSIS — L73.9 FOLLICULITIS: ICD-10-CM

## 2023-03-22 DIAGNOSIS — E28.2 PCOS (POLYCYSTIC OVARIAN SYNDROME): ICD-10-CM

## 2023-03-22 DIAGNOSIS — R79.89 ELEVATED TESTOSTERONE LEVEL IN FEMALE: ICD-10-CM

## 2023-03-22 DIAGNOSIS — N92.6 MENSES, IRREGULAR: ICD-10-CM

## 2023-03-22 DIAGNOSIS — R73.03 PRE-DIABETES: ICD-10-CM

## 2023-03-22 DIAGNOSIS — Z01.419 ENCOUNTER FOR WELL WOMAN EXAM WITH ROUTINE GYNECOLOGICAL EXAM: Primary | ICD-10-CM

## 2023-03-22 PROBLEM — B00.9 HSV-2 INFECTION: Status: ACTIVE | Noted: 2023-02-28

## 2023-03-22 PROBLEM — Z72.0 TOBACCO ABUSE: Status: ACTIVE | Noted: 2023-02-28

## 2023-03-22 PROCEDURE — 1159F MED LIST DOCD IN RCRD: CPT | Mod: CPTII,S$GLB,, | Performed by: OBSTETRICS & GYNECOLOGY

## 2023-03-22 PROCEDURE — 1159F PR MEDICATION LIST DOCUMENTED IN MEDICAL RECORD: ICD-10-PCS | Mod: CPTII,S$GLB,, | Performed by: OBSTETRICS & GYNECOLOGY

## 2023-03-22 PROCEDURE — 3075F SYST BP GE 130 - 139MM HG: CPT | Mod: CPTII,S$GLB,, | Performed by: OBSTETRICS & GYNECOLOGY

## 2023-03-22 PROCEDURE — 3079F DIAST BP 80-89 MM HG: CPT | Mod: CPTII,S$GLB,, | Performed by: OBSTETRICS & GYNECOLOGY

## 2023-03-22 PROCEDURE — 3079F PR MOST RECENT DIASTOLIC BLOOD PRESSURE 80-89 MM HG: ICD-10-PCS | Mod: CPTII,S$GLB,, | Performed by: OBSTETRICS & GYNECOLOGY

## 2023-03-22 PROCEDURE — 3008F BODY MASS INDEX DOCD: CPT | Mod: CPTII,S$GLB,, | Performed by: OBSTETRICS & GYNECOLOGY

## 2023-03-22 PROCEDURE — 99395 PR PREVENTIVE VISIT,EST,18-39: ICD-10-PCS | Mod: S$GLB,,, | Performed by: OBSTETRICS & GYNECOLOGY

## 2023-03-22 PROCEDURE — 99395 PREV VISIT EST AGE 18-39: CPT | Mod: S$GLB,,, | Performed by: OBSTETRICS & GYNECOLOGY

## 2023-03-22 PROCEDURE — 3075F PR MOST RECENT SYSTOLIC BLOOD PRESS GE 130-139MM HG: ICD-10-PCS | Mod: CPTII,S$GLB,, | Performed by: OBSTETRICS & GYNECOLOGY

## 2023-03-22 PROCEDURE — 3008F PR BODY MASS INDEX (BMI) DOCUMENTED: ICD-10-PCS | Mod: CPTII,S$GLB,, | Performed by: OBSTETRICS & GYNECOLOGY

## 2023-03-22 RX ORDER — SULFAMETHOXAZOLE AND TRIMETHOPRIM 800; 160 MG/1; MG/1
1 TABLET ORAL 2 TIMES DAILY
Qty: 14 TABLET | Refills: 0 | Status: SHIPPED | OUTPATIENT
Start: 2023-03-22 | End: 2023-03-29

## 2023-03-22 RX ORDER — METFORMIN HYDROCHLORIDE 500 MG/1
500 TABLET ORAL 2 TIMES DAILY
COMMUNITY
Start: 2023-03-01 | End: 2023-03-22

## 2023-03-27 LAB — Lab: NORMAL

## 2023-03-28 ENCOUNTER — PROCEDURE VISIT (OUTPATIENT)
Dept: OBSTETRICS AND GYNECOLOGY | Facility: CLINIC | Age: 33
End: 2023-03-28
Payer: COMMERCIAL

## 2023-03-28 DIAGNOSIS — N92.6 MENSES, IRREGULAR: ICD-10-CM

## 2023-03-28 PROCEDURE — 76856 US EXAM PELVIC COMPLETE: CPT | Mod: S$GLB,,, | Performed by: OBSTETRICS & GYNECOLOGY

## 2023-03-28 PROCEDURE — 76856 US OB/GYN PROCEDURE (VIEWPOINT): ICD-10-PCS | Mod: S$GLB,,, | Performed by: OBSTETRICS & GYNECOLOGY

## 2023-04-17 ENCOUNTER — PATIENT MESSAGE (OUTPATIENT)
Dept: OBSTETRICS AND GYNECOLOGY | Facility: CLINIC | Age: 33
End: 2023-04-17
Payer: COMMERCIAL

## 2023-04-17 DIAGNOSIS — E28.2 PCOS (POLYCYSTIC OVARIAN SYNDROME): Primary | ICD-10-CM

## 2023-04-17 DIAGNOSIS — L73.9 FOLLICULITIS: Primary | ICD-10-CM

## 2023-04-17 RX ORDER — MUPIROCIN 20 MG/G
OINTMENT TOPICAL 2 TIMES DAILY
Qty: 30 G | Refills: 2 | Status: SHIPPED | OUTPATIENT
Start: 2023-04-17 | End: 2023-04-22

## 2023-04-17 NOTE — TELEPHONE ENCOUNTER
The bump looks like a chronic healing folliculitis. If she had no improvement at all with the bactrim it may have healed over and is tender based on its location. If bactrim helped some, I can send out another weeks rx. But if not, I would use some bactroban and dont shave there and minimize any friction from clothing until it softens and heals.   As for the cycles being q40-41 days, she really needs to get back with shante and tell them when she makes an appt that she only wants to see shante and not a male NP.  When is the last time she has had labwork? Im placing orders for labs for her to do fasting, if she hasnt done any for shante in last 3 months so she will have that to review. With her hx of abnormal insulin and testosterone, they are likely not where they need to be. Also, how exactly did she test for ovulation? Its hard to guess when she will ovulate with longer cycles but normaly they say test every am starting 10 days after cycle until next cycle starts to make sure she isnt missing it

## 2023-04-17 NOTE — TELEPHONE ENCOUNTER
You gave April wisdom at her last visit for a bump on vagina but it's still there. Please advise and I will call patient.

## 2023-04-25 ENCOUNTER — PATIENT MESSAGE (OUTPATIENT)
Dept: OBSTETRICS AND GYNECOLOGY | Facility: CLINIC | Age: 33
End: 2023-04-25
Payer: COMMERCIAL

## 2023-05-04 ENCOUNTER — PATIENT MESSAGE (OUTPATIENT)
Dept: OBSTETRICS AND GYNECOLOGY | Facility: CLINIC | Age: 33
End: 2023-05-04
Payer: MEDICAID

## 2023-05-04 LAB
ABS NRBC COUNT: 0 X 10 3/UL (ref 0–0.01)
ABSOLUTE BASOPHIL: 0.03 X 10 3/UL (ref 0–0.22)
ABSOLUTE EOSINOPHIL: 0.13 X 10 3/UL (ref 0.04–0.54)
ABSOLUTE IMMATURE GRAN: 0 X 10 3/UL (ref 0–0.04)
ABSOLUTE LYMPHOCYTE: 2.05 X 10 3/UL (ref 0.86–4.75)
ABSOLUTE MONOCYTE: 0.34 X 10 3/UL (ref 0.22–1.08)
ALBUMIN SERPL-MCNC: 4.3 G/DL (ref 3.5–5.2)
ALBUMIN/GLOB SERPL ELPH: 1.7 {RATIO} (ref 1–2.7)
ALP ISOS SERPL LEV INH-CCNC: 60 U/L (ref 35–105)
ALT (SGPT): 22 U/L (ref 0–33)
ANION GAP SERPL CALC-SCNC: 9 MMOL/L (ref 8–17)
AST SERPL-CCNC: 23 U/L (ref 0–32)
B-HCG SERPL-ACNC: <1.59 MIU/ML
BASOPHILS NFR BLD: 0.7 % (ref 0.2–1.2)
BILIRUBIN, TOTAL: 0.27 MG/DL (ref 0–1.2)
BUN/CREAT SERPL: 12.3 (ref 6–20)
CALCIUM SERPL-MCNC: 9.5 MG/DL (ref 8.6–10.2)
CARBON DIOXIDE, CO2: 27 MMOL/L (ref 22–29)
CHLORIDE: 102 MMOL/L (ref 98–107)
CREAT SERPL-MCNC: 0.75 MG/DL (ref 0.5–0.9)
DHEA SULFATE: 485 UG/DL (ref 98.8–340)
EOSINOPHIL NFR BLD: 3 % (ref 0.7–7)
ESTIMATED AVERAGE GLUCOSE: 115 MG/DL
FREE TESTOSTERONE: 1.38 NG/DL (ref 0–1)
FSH: 5.5 MIU/ML
GFR ESTIMATION: 108.41
GLOBULIN: 2.6 G/DL (ref 1.5–4.5)
GLUCOSE: 124 MG/DL (ref 74–106)
HBA1C MFR BLD: 5.6 % (ref 4–6)
HCT VFR BLD AUTO: 40.8 % (ref 37–47)
HGB BLD-MCNC: 14.5 G/DL (ref 12–16)
IMMATURE GRANULOCYTES: 0 % (ref 0–0.5)
INSULIN AB SER QL: 21.4 UIU/ML (ref 2.6–24.9)
LH: 6.41 MIU/ML
LYMPHOCYTES NFR BLD: 46.7 % (ref 19.3–53.1)
MCH RBC QN AUTO: 34.7 PG (ref 27–32)
MCHC RBC AUTO-ENTMCNC: 35.5 G/DL (ref 32–36)
MCV RBC AUTO: 97.6 FL (ref 82–100)
MONOCYTES NFR BLD: 7.7 % (ref 4.7–12.5)
NEUTROPHILS # BLD AUTO: 1.84 X 10 3/UL (ref 2.15–7.56)
NEUTROPHILS NFR BLD: 41.9 % (ref 34–71.1)
NUCLEATED RED BLOOD CELLS: 0 /100 WBC (ref 0–0.2)
PLATELET # BLD AUTO: 317 X 10 3/UL (ref 135–400)
POTASSIUM: 4.2 MMOL/L (ref 3.5–5.1)
PROLACTIN SERPL-MCNC: 10.2 NG/ML (ref 4.79–23.3)
PROT SNV-MCNC: 6.9 G/DL (ref 6.4–8.3)
RBC # BLD AUTO: 4.18 X 10 6/UL (ref 4.2–5.4)
RDW-SD: 44.9 FL (ref 37–54)
SODIUM: 138 MMOL/L (ref 136–145)
T4, FREE: 1.51 NG/DL (ref 0.93–1.7)
TSH SERPL DL<=0.005 MIU/L-ACNC: 1.31 UIU/ML (ref 0.27–4.2)
UREA NITROGEN (BUN): 9.2 MG/DL (ref 6–20)
WBC # BLD: 4.39 X 10 3/UL (ref 4.3–10.8)

## 2023-05-04 NOTE — PROGRESS NOTES
Please let pt know her labs still show elevated testosterone and dheas levels, which are the more adrogenic/male type hormones.  Her insulin is in normal range but near upper end, so could still be a little better, but her thyroid and female hormones look good. In pcos we often see the LH being at least double the FSH level but hers are not which is good and I think most likely the biggest problem is getting the testosterone and dheas levels down to normal with endocrinology. She had been to endocrinology but only wanted to see a female (shante) and didn't want to see the male Nps. I can send a new referral or she can make appt with them herself and let them know that when she schedules. In the meantime, since she was trying for pregnancy, we should check her partner's semen analysis and can give her those orders/cup if she wants

## 2023-05-04 NOTE — PROGRESS NOTES
She can ask Dr Landin when she sees her but I expect she will also say her endocrinologist should do any further workup she needs and follow these levels.  Her 's semen analysis is also very abnormal from last year and he needs to see urology for further evaluation. He has a very low sperm count and the ones they do see are not moving normally.

## 2023-05-17 ENCOUNTER — PATIENT MESSAGE (OUTPATIENT)
Dept: OBSTETRICS AND GYNECOLOGY | Facility: CLINIC | Age: 33
End: 2023-05-17
Payer: MEDICAID

## 2023-05-17 DIAGNOSIS — B37.2 YEAST DERMATITIS: Primary | ICD-10-CM

## 2023-05-17 RX ORDER — FLUCONAZOLE 200 MG/1
200 TABLET ORAL DAILY
Qty: 3 TABLET | Refills: 0 | Status: SHIPPED | OUTPATIENT
Start: 2023-05-17 | End: 2023-05-20

## 2023-05-17 RX ORDER — CLOTRIMAZOLE AND BETAMETHASONE DIPROPIONATE 10; .64 MG/G; MG/G
CREAM TOPICAL 2 TIMES DAILY
Qty: 45 G | Refills: 0 | Status: SHIPPED | OUTPATIENT
Start: 2023-05-17 | End: 2023-05-31

## 2023-05-17 NOTE — TELEPHONE ENCOUNTER
With discharge and itching there is a good chance it may be yeast. Im going to send out diflucan pills for 3 days and then also some cream to put on outside. If not getting significantly better in next couple days, we need to have her come in

## 2023-06-05 ENCOUNTER — TELEPHONE (OUTPATIENT)
Dept: OBSTETRICS AND GYNECOLOGY | Facility: CLINIC | Age: 33
End: 2023-06-05
Payer: COMMERCIAL

## 2023-06-05 NOTE — TELEPHONE ENCOUNTER
----- Message from Judie Hernandez sent at 6/5/2023  8:44 AM CDT -----  Type:  Same Day Appointment Request    Caller is requesting a same day appointment.  Caller declined first available appointment listed below.    Name of Caller:April Feldman  When is the first available appointment?  6/5/23  Symptoms: vaginal isssues  Best Call Back Number:[phone]    Additional Information: requesting SDA please call          X Size Of Lesion In Cm: 0 Was A Bandage Applied: Yes Wound Care: Vaseline Consent: Verbal consent was obtained and risks were reviewed including but not limited to scarring, infection, bleeding, scabbing, incomplete removal, nerve damage and allergy to anesthesia. Billing Type: Third-Party Bill Curettage Text: The wound bed was treated with curettage after the biopsy was performed. Anesthesia Volume In Cc (Will Not Render If 0): 0.5 Body Location Override (Optional - Billing Will Still Be Based On Selected Body Map Location If Applicable): left middle toe Silver Nitrate Text: The wound bed was treated with silver nitrate after the biopsy was performed. Electrodesiccation And Curettage Text: The wound bed was treated with electrodesiccation and curettage after the biopsy was performed. Hemostasis: Drysol Electrodesiccation Text: The wound bed was treated with electrodesiccation after the biopsy was performed. Bill For Surgical Tray: no Cryotherapy Text: The wound bed was treated with cryotherapy after the biopsy was performed. Lab Facility: [id number] Detail Level: Detailed Post-Care Instructions: I reviewed with the patient in detail post-care instructions. Patient is to keep the biopsy site dry overnight, and then apply bacitracin twice daily until healed. Patient may apply hydrogen peroxide soaks to remove any crusting. Biopsy Method: 15 blade Depth Of Biopsy: dermis Type Of Destruction Used: Electrodesiccation Dressing: bandage Anesthesia Type: 2% lidocaine without epinephrine Lab: [id number] Notification Instructions: Patient will be notified of biopsy results. However, patient instructed to call the office if not contacted within 2 weeks. Biopsy Type: H and E

## 2023-07-05 ENCOUNTER — OFFICE VISIT (OUTPATIENT)
Dept: FAMILY MEDICINE | Facility: CLINIC | Age: 33
End: 2023-07-05
Payer: COMMERCIAL

## 2023-07-05 VITALS
SYSTOLIC BLOOD PRESSURE: 120 MMHG | DIASTOLIC BLOOD PRESSURE: 80 MMHG | TEMPERATURE: 97 F | HEART RATE: 98 BPM | WEIGHT: 170 LBS | HEIGHT: 64 IN | RESPIRATION RATE: 15 BRPM | OXYGEN SATURATION: 98 % | BODY MASS INDEX: 29.02 KG/M2

## 2023-07-05 DIAGNOSIS — E28.2 PCOS (POLYCYSTIC OVARIAN SYNDROME): ICD-10-CM

## 2023-07-05 DIAGNOSIS — Z76.89 ENCOUNTER TO ESTABLISH CARE: Primary | ICD-10-CM

## 2023-07-05 PROCEDURE — 1160F RVW MEDS BY RX/DR IN RCRD: CPT | Mod: CPTII,S$GLB,, | Performed by: INTERNAL MEDICINE

## 2023-07-05 PROCEDURE — 3044F PR MOST RECENT HEMOGLOBIN A1C LEVEL <7.0%: ICD-10-PCS | Mod: CPTII,S$GLB,, | Performed by: INTERNAL MEDICINE

## 2023-07-05 PROCEDURE — 99385 PR PREVENTIVE VISIT,NEW,18-39: ICD-10-PCS | Mod: S$GLB,,, | Performed by: INTERNAL MEDICINE

## 2023-07-05 PROCEDURE — 99385 PREV VISIT NEW AGE 18-39: CPT | Mod: S$GLB,,, | Performed by: INTERNAL MEDICINE

## 2023-07-05 PROCEDURE — 3074F PR MOST RECENT SYSTOLIC BLOOD PRESSURE < 130 MM HG: ICD-10-PCS | Mod: CPTII,S$GLB,, | Performed by: INTERNAL MEDICINE

## 2023-07-05 PROCEDURE — 1160F PR REVIEW ALL MEDS BY PRESCRIBER/CLIN PHARMACIST DOCUMENTED: ICD-10-PCS | Mod: CPTII,S$GLB,, | Performed by: INTERNAL MEDICINE

## 2023-07-05 PROCEDURE — 3074F SYST BP LT 130 MM HG: CPT | Mod: CPTII,S$GLB,, | Performed by: INTERNAL MEDICINE

## 2023-07-05 PROCEDURE — 3079F PR MOST RECENT DIASTOLIC BLOOD PRESSURE 80-89 MM HG: ICD-10-PCS | Mod: CPTII,S$GLB,, | Performed by: INTERNAL MEDICINE

## 2023-07-05 PROCEDURE — 1159F PR MEDICATION LIST DOCUMENTED IN MEDICAL RECORD: ICD-10-PCS | Mod: CPTII,S$GLB,, | Performed by: INTERNAL MEDICINE

## 2023-07-05 PROCEDURE — 3008F BODY MASS INDEX DOCD: CPT | Mod: CPTII,S$GLB,, | Performed by: INTERNAL MEDICINE

## 2023-07-05 PROCEDURE — 3079F DIAST BP 80-89 MM HG: CPT | Mod: CPTII,S$GLB,, | Performed by: INTERNAL MEDICINE

## 2023-07-05 PROCEDURE — 1159F MED LIST DOCD IN RCRD: CPT | Mod: CPTII,S$GLB,, | Performed by: INTERNAL MEDICINE

## 2023-07-05 PROCEDURE — 3008F PR BODY MASS INDEX (BMI) DOCUMENTED: ICD-10-PCS | Mod: CPTII,S$GLB,, | Performed by: INTERNAL MEDICINE

## 2023-07-05 PROCEDURE — 3044F HG A1C LEVEL LT 7.0%: CPT | Mod: CPTII,S$GLB,, | Performed by: INTERNAL MEDICINE

## 2023-07-05 RX ORDER — METFORMIN HYDROCHLORIDE 500 MG/1
500 TABLET ORAL 2 TIMES DAILY
COMMUNITY
Start: 2023-05-11 | End: 2023-07-05 | Stop reason: SDUPTHER

## 2023-07-05 RX ORDER — METFORMIN HYDROCHLORIDE 500 MG/1
500 TABLET ORAL 2 TIMES DAILY
Qty: 180 TABLET | Refills: 1 | Status: SHIPPED | OUTPATIENT
Start: 2023-07-05 | End: 2024-01-18

## 2023-07-05 NOTE — PROGRESS NOTES
Subjective:       Patient ID: April Feldman is a 32 y.o. female.    Chief Complaint: Establish Care (New pt. To est care.  Pt. Was seeing endocrinologist (at Dr. Cecilia Frederick's office, but states she never saw Dr. Frederick) for PCOS and trying to conceive, but feels like she was not getting adequate care.   Pt. Also sees Dr. Nicolle Lopez for gyn.)    HPI    32 y.o. female here to establish care.       Health Maintenance Topics with due status: Not Due       Topic Last Completion Date    TETANUS VACCINE 10/11/2013    Cervical Cancer Screening 03/22/2023    Hemoglobin A1c (Prediabetes) 05/04/2023    Influenza Vaccine Not Due       Health Maintenance Due   Topic Date Due    Lipid Panel  Never done    COVID-19 Vaccine (1) Never done    Pneumococcal Vaccines (Age 0-64) (1 - PCV) Never done         Medical Problems:      Patient Active Problem List   Diagnosis    Tobacco abuse    Pre-diabetes    PCOS (polycystic ovarian syndrome)    HSV-2 infection    Elevated testosterone level in female       Current Outpatient Medications on File Prior to Visit   Medication Sig Dispense Refill    valACYclovir (VALTREX) 1000 MG tablet Take 1 tablet (1,000 mg total) by mouth once daily. 90 tablet 2    [DISCONTINUED] metFORMIN (GLUCOPHAGE) 500 MG tablet Take 500 mg by mouth 2 (two) times daily.      [DISCONTINUED] metFORMIN (GLUCOPHAGE-XR) 500 MG ER 24hr tablet       [DISCONTINUED] valACYclovir (VALTREX) 500 MG tablet Take 1 tablet (500 mg total) by mouth 2 (two) times daily. 10 tablet 3     No current facility-administered medications on file prior to visit.           Past Medical History:   Diagnosis Date    Anxiety and depression     Elevated DHEA 2021    Elevated testosterone level in female 2021    ref to endocrinology-shante    Folliculitis     History of UTI     HSV-2 infection 01/2023    genital    Menses, irregular     PCOS (polycystic ovarian syndrome)     Pre-diabetes     Sickle cell trait      Past Surgical  History:   Procedure Laterality Date     SECTION       Family History   Problem Relation Age of Onset    Sickle cell anemia Mother     Diabetes Father     Sickle cell trait Father     Pancreatic cancer Maternal Grandmother     Diabetes Maternal Grandmother     Lung cancer Paternal Grandmother     Diabetes Paternal Grandmother     Colon cancer Paternal Grandfather     Lupus Maternal Aunt     Breast cancer Other         great great  maternal aunt     Social History     Socioeconomic History    Marital status: Single   Tobacco Use    Smoking status: Every Day     Types: Cigars     Passive exposure: Never    Smokeless tobacco: Never   Substance and Sexual Activity    Alcohol use: Yes     Alcohol/week: 4.0 standard drinks     Types: 4 Glasses of wine per week     Comment: weekly    Drug use: Never    Sexual activity: Yes     Partners: Male     Birth control/protection: None     Review of patient's allergies indicates:   Allergen Reactions    Pcn [penicillins]        Current Outpatient Medications:     valACYclovir (VALTREX) 1000 MG tablet, Take 1 tablet (1,000 mg total) by mouth once daily., Disp: 90 tablet, Rfl: 2    metFORMIN (GLUCOPHAGE) 500 MG tablet, Take 1 tablet (500 mg total) by mouth 2 (two) times daily., Disp: 180 tablet, Rfl: 1        Review of Systems   Constitutional:  Negative for diaphoresis and fever.   HENT:  Negative for trouble swallowing.    Respiratory:  Negative for cough and shortness of breath.    Cardiovascular:  Negative for chest pain and leg swelling.   Gastrointestinal:  Negative for abdominal pain and blood in stool.   Genitourinary:  Negative for difficulty urinating and dysuria.   Musculoskeletal:  Negative for gait problem.   Skin:  Negative for pallor.   Neurological:  Negative for syncope and weakness.     Objective:        Vitals:    23 1527   BP: 120/80   BP Location: Left arm   Patient Position: Sitting   BP Method: Large (Manual)   Pulse: 98   Resp: 15   Temp: 97.3 °F  "(36.3 °C)   TempSrc: Temporal   SpO2: 98%   Weight: 77.1 kg (170 lb)   Height: 5' 4" (1.626 m)       Body mass index is 29.18 kg/m².    Physical Exam  Constitutional:       General: She is not in acute distress.     Appearance: She is well-developed. She is not diaphoretic.   HENT:      Head: Normocephalic and atraumatic.      Right Ear: External ear normal.      Left Ear: External ear normal.   Eyes:      Conjunctiva/sclera: Conjunctivae normal.   Cardiovascular:      Rate and Rhythm: Normal rate and regular rhythm.      Heart sounds: Normal heart sounds.   Pulmonary:      Effort: Pulmonary effort is normal.      Breath sounds: Normal breath sounds.   Abdominal:      General: There is no distension.      Palpations: Abdomen is soft.   Musculoskeletal:      Cervical back: Neck supple.      Right lower leg: No edema.      Left lower leg: No edema.   Skin:     General: Skin is warm and dry.      Nails: There is no clubbing.   Neurological:      General: No focal deficit present.      Mental Status: She is alert.   Psychiatric:         Behavior: Behavior normal.         Judgment: Judgment normal.       Assessment:     1. Encounter to establish care    2. PCOS (polycystic ovarian syndrome)           Plan:         1. Encounter to establish care  - reviewed healthcare maintenance and pt's chronic medical problems.   - labs reviewed  - immunizations reviewed  - pap/gyn utd    2. PCOS (polycystic ovarian syndrome)  - agree w/ obgyn rec's to re-establish w/ endocrinology  - metFORMIN (GLUCOPHAGE) 500 MG tablet; Take 1 tablet (500 mg total) by mouth 2 (two) times daily.  Dispense: 180 tablet; Refill: 1                Unless there are intervening problems, Follow up in about 1 year (around 7/5/2024), or if symptoms worsen or fail to improve, for annual.      Patient note was created using [employer]ict[employer].  Any errors in syntax or even information may not have been identified and edited on initial review prior to signing this " note.

## 2023-11-16 ENCOUNTER — PATIENT MESSAGE (OUTPATIENT)
Dept: OBSTETRICS AND GYNECOLOGY | Facility: CLINIC | Age: 33
End: 2023-11-16
Payer: MEDICAID

## 2024-01-15 DIAGNOSIS — B00.9 HSV-2 INFECTION: ICD-10-CM

## 2024-01-16 DIAGNOSIS — E28.2 PCOS (POLYCYSTIC OVARIAN SYNDROME): ICD-10-CM

## 2024-01-16 RX ORDER — VALACYCLOVIR HYDROCHLORIDE 1 G/1
1000 TABLET, FILM COATED ORAL DAILY
Qty: 90 TABLET | Refills: 2 | Status: SHIPPED | OUTPATIENT
Start: 2024-01-16 | End: 2024-04-12 | Stop reason: SDUPTHER

## 2024-01-18 RX ORDER — METFORMIN HYDROCHLORIDE 500 MG/1
500 TABLET ORAL 2 TIMES DAILY
Qty: 180 TABLET | Refills: 0 | Status: SHIPPED | OUTPATIENT
Start: 2024-01-18 | End: 2024-04-15

## 2024-04-11 NOTE — PROGRESS NOTES
"CC: WELL WOMAN (age 26-39)     Patient Care Team:  Ignacia Landin MD as PCP - General (Internal Medicine)  Cecilia Frederick MD (Endocrinology)    Last visit with me was on  3/22/2023 for wwe    HPI:  Patient is a 33 y.o. who presents for her well woman exam today.  History reviewed with patient. Discussed [facility] testing and patient would like to do it and given code to enter info and get order.  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    SOCIAL HX:   reports that she has been smoking cigars. She has never been exposed to tobacco smoke. She has never used smokeless tobacco. She reports current alcohol use of about 4.0 standard drinks of alcohol per week. She reports that she does not use drugs.    Outpatient Medications Prior to Visit   Medication Sig Dispense Refill    metFORMIN (GLUCOPHAGE) 500 MG tablet Take 1 tablet by mouth twice daily 180 tablet 0    valACYclovir (VALTREX) 1000 MG tablet Take 1 tablet (1,000 mg total) by mouth once daily. 90 tablet 2     No facility-administered medications prior to visit.      VITALS:  Blood pressure 116/78, height 5' 4" (1.626 m), weight 76.7 kg (169 lb), last menstrual period 2024.  Body mass index is 29.01 kg/m².     PHYSICAL EXAM-  APPEARANCE: Well appearing, in no acute distress.  CV/ PULM: no resp distress, normal resp effort  PSYCH: Normal mood and affect, " cooperative  SKIN: No rashes, lesions, or abnormal bruising  ABD: Soft, no masses, tenderness, or distension  BREASTS: No skin changes,nipple dc. No palpable masses or tenderness  PELVIC:  VULVA: Normal female genitalia. No lesions  BLADDER: No suprapubic tenderness  VAGINA/ CVX:  No lesions, _yeast  UTERUS: mobile, non-tender  ADNEXA: No masses, tenderness, or fullness.  ANUS/ PERINEUM: Normal tone.  No lesions.           *patient verbally consented for exam and female chaperone present for entire exam    ASSESSMENT and PLAN:  Encounter for well woman exam with routine gynecological exam  -     Liquid-based pap smear, screening    Family history of pancreatic cancer  -     BRCAnalysis w/ [facility]; Future; Expected date: 04/12/2024    Yeast vaginitis  -     fluconazole (DIFLUCAN) 150 MG Tab; Take 1 tablet (150 mg total) by mouth every other day. One po q other day for 2 doses  Dispense: 2 tablet; Refill: 0    HSV-2 infection  -     valACYclovir (VALTREX) 1000 MG tablet; Take 1 tablet (1,000 mg total) by mouth once daily.  Dispense: 90 tablet; Refill: 2       FOLLOWUP:  1 yr for wwe or sooner prn    COUNSELING:  Patient was counseled today on recommendation for yearly pelvic exams, current Pap guidelines, self breast exams, contraception, and recommendations for annual screening mammograms at age 40. Encouraged patient to see her PCP for other health maintenance.

## 2024-04-12 ENCOUNTER — TELEPHONE (OUTPATIENT)
Dept: OBSTETRICS AND GYNECOLOGY | Facility: CLINIC | Age: 34
End: 2024-04-12
Payer: COMMERCIAL

## 2024-04-12 ENCOUNTER — OFFICE VISIT (OUTPATIENT)
Dept: OBSTETRICS AND GYNECOLOGY | Facility: CLINIC | Age: 34
End: 2024-04-12
Payer: COMMERCIAL

## 2024-04-12 VITALS
WEIGHT: 169 LBS | BODY MASS INDEX: 28.85 KG/M2 | SYSTOLIC BLOOD PRESSURE: 116 MMHG | DIASTOLIC BLOOD PRESSURE: 78 MMHG | HEIGHT: 64 IN

## 2024-04-12 DIAGNOSIS — B00.9 HSV-2 INFECTION: ICD-10-CM

## 2024-04-12 DIAGNOSIS — B37.31 YEAST VAGINITIS: ICD-10-CM

## 2024-04-12 DIAGNOSIS — Z80.0 FAMILY HISTORY OF PANCREATIC CANCER: ICD-10-CM

## 2024-04-12 DIAGNOSIS — Z01.419 ENCOUNTER FOR WELL WOMAN EXAM WITH ROUTINE GYNECOLOGICAL EXAM: Primary | ICD-10-CM

## 2024-04-12 PROCEDURE — 1159F MED LIST DOCD IN RCRD: CPT | Mod: CPTII,S$GLB,, | Performed by: OBSTETRICS & GYNECOLOGY

## 2024-04-12 PROCEDURE — 99395 PREV VISIT EST AGE 18-39: CPT | Mod: S$GLB,,, | Performed by: OBSTETRICS & GYNECOLOGY

## 2024-04-12 PROCEDURE — 3074F SYST BP LT 130 MM HG: CPT | Mod: CPTII,S$GLB,, | Performed by: OBSTETRICS & GYNECOLOGY

## 2024-04-12 PROCEDURE — 99459 PELVIC EXAMINATION: CPT | Mod: S$GLB,,, | Performed by: OBSTETRICS & GYNECOLOGY

## 2024-04-12 PROCEDURE — 3078F DIAST BP <80 MM HG: CPT | Mod: CPTII,S$GLB,, | Performed by: OBSTETRICS & GYNECOLOGY

## 2024-04-12 PROCEDURE — 3008F BODY MASS INDEX DOCD: CPT | Mod: CPTII,S$GLB,, | Performed by: OBSTETRICS & GYNECOLOGY

## 2024-04-12 RX ORDER — FLUCONAZOLE 150 MG/1
150 TABLET ORAL EVERY OTHER DAY
Qty: 2 TABLET | Refills: 0 | Status: SHIPPED | OUTPATIENT
Start: 2024-04-12 | End: 2024-04-15

## 2024-04-12 RX ORDER — VALACYCLOVIR HYDROCHLORIDE 1 G/1
1000 TABLET, FILM COATED ORAL DAILY
Qty: 90 TABLET | Refills: 2 | Status: SHIPPED | OUTPATIENT
Start: 2024-04-12

## 2024-04-12 NOTE — TELEPHONE ENCOUNTER
----- Message from Jennifer Siddiqui sent at 4/12/2024 12:27 PM CDT -----  Contact: SHEA MUSTAFA [[id number]]  ..Type:  Patient Requesting Call    Who Called: SHEA MUSTAFA [[id number]]  Does the patient know what this is regarding?: need dr excuse from 4/12  Would the patient rather a call back or a response via [facility]?  call  Best Call Back Number: .[phone] (home)   Additional Information:

## 2024-04-13 DIAGNOSIS — E28.2 PCOS (POLYCYSTIC OVARIAN SYNDROME): ICD-10-CM

## 2024-04-15 LAB — Lab: NORMAL

## 2024-04-15 RX ORDER — METFORMIN HYDROCHLORIDE 500 MG/1
500 TABLET ORAL 2 TIMES DAILY
Qty: 180 TABLET | Refills: 0 | Status: SHIPPED | OUTPATIENT
Start: 2024-04-15

## 2024-07-18 ENCOUNTER — OFFICE VISIT (OUTPATIENT)
Dept: FAMILY MEDICINE | Facility: CLINIC | Age: 34
End: 2024-07-18
Payer: COMMERCIAL

## 2024-07-18 VITALS
HEART RATE: 90 BPM | OXYGEN SATURATION: 99 % | BODY MASS INDEX: 28.14 KG/M2 | WEIGHT: 164.81 LBS | RESPIRATION RATE: 15 BRPM | HEIGHT: 64 IN | DIASTOLIC BLOOD PRESSURE: 80 MMHG | TEMPERATURE: 98 F | SYSTOLIC BLOOD PRESSURE: 120 MMHG

## 2024-07-18 DIAGNOSIS — R20.0 THUMB NUMBNESS: ICD-10-CM

## 2024-07-18 DIAGNOSIS — L98.9 SCALP LESION: ICD-10-CM

## 2024-07-18 DIAGNOSIS — Z00.00 ANNUAL PHYSICAL EXAM: Primary | ICD-10-CM

## 2024-07-18 DIAGNOSIS — E28.2 PCOS (POLYCYSTIC OVARIAN SYNDROME): ICD-10-CM

## 2024-07-18 DIAGNOSIS — R73.03 PRE-DIABETES: ICD-10-CM

## 2024-07-18 PROCEDURE — 3074F SYST BP LT 130 MM HG: CPT | Mod: CPTII,S$GLB,, | Performed by: INTERNAL MEDICINE

## 2024-07-18 PROCEDURE — 1159F MED LIST DOCD IN RCRD: CPT | Mod: CPTII,S$GLB,, | Performed by: INTERNAL MEDICINE

## 2024-07-18 PROCEDURE — 3079F DIAST BP 80-89 MM HG: CPT | Mod: CPTII,S$GLB,, | Performed by: INTERNAL MEDICINE

## 2024-07-18 PROCEDURE — 99395 PREV VISIT EST AGE 18-39: CPT | Mod: S$GLB,,, | Performed by: INTERNAL MEDICINE

## 2024-07-18 PROCEDURE — 3008F BODY MASS INDEX DOCD: CPT | Mod: CPTII,S$GLB,, | Performed by: INTERNAL MEDICINE

## 2024-07-18 PROCEDURE — 1160F RVW MEDS BY RX/DR IN RCRD: CPT | Mod: CPTII,S$GLB,, | Performed by: INTERNAL MEDICINE

## 2024-07-18 RX ORDER — METFORMIN HYDROCHLORIDE 500 MG/1
500 TABLET ORAL 2 TIMES DAILY
Qty: 180 TABLET | Refills: 1 | Status: SHIPPED | OUTPATIENT
Start: 2024-07-18

## 2024-07-18 NOTE — PROGRESS NOTES
Subjective:       Patient ID: April Feldman is a 33 y.o. female.    Chief Complaint: Annual Exam (C/o scab on head and would like ref. To derm.  C/o Rt. Thumb numbness x 1 month.  Needs RF of Metformin.  Needs annual labs.  )    HPI    33 y.o. female here for annual exam.         Health Maintenance Topics with due status: Not Due       Topic Last Completion Date    Cervical Cancer Screening 2024    Influenza Vaccine Not Due       Health Maintenance Due   Topic Date Due    Hepatitis C Screening  Never done    Lipid Panel  Never done    Pneumococcal Vaccines (Age 0-64) (1 of 2 - PCV) Never done    HIV Screening  Never done    COVID-19 Vaccine (2023-24 season) Never done    TETANUS VACCINE  10/11/2023    Hemoglobin A1c (Prediabetes)  2024         Medical Problems:        Past Medical History:   Diagnosis Date    Anxiety and depression     Elevated DHEA     Elevated testosterone level in female     ref to endocrinology-[facility]    Folliculitis     History of UTI     HSV-2 infection 2023    genital    Menses, irregular     PCOS (polycystic ovarian syndrome)     Pre-diabetes     Sickle cell trait      Past Surgical History:   Procedure Laterality Date     SECTION       Family History   Problem Relation Name Age of Onset    Sickle cell anemia Mother      Diabetes Father      Sickle cell trait Father      Pancreatic cancer Maternal Grandmother      Diabetes Maternal Grandmother      Lung cancer Paternal Grandmother      Diabetes Paternal Grandmother      Colon cancer Paternal Grandfather      Lupus Maternal Aunt      Breast cancer Other          great great  maternal aunt     Social History     Socioeconomic History    Marital status: Significant Other   Tobacco Use    Smoking status: Every Day     Types: Cigars     Passive exposure: Never    Smokeless tobacco: Never   Substance and Sexual Activity    Alcohol use: Yes     Alcohol/week: 4.0 standard drinks of alcohol     Types:  4 Glasses of wine per week     Comment: weekly    Drug use: Never    Sexual activity: Yes     Partners: Male     Birth control/protection: None     Social Determinants of Health     Financial Resource Strain: Low Risk  (7/18/2024)    Overall Financial Resource Strain (CARDIA)     Difficulty of Paying Living Expenses: Not hard at all   Food Insecurity: No Food Insecurity (7/18/2024)    Hunger Vital Sign     Worried About Running Out of Food in the Last Year: Never true     Ran Out of Food in the Last Year: Never true   Physical Activity: Unknown (7/18/2024)    Exercise Vital Sign     Days of Exercise per Week: 4 days   Stress: No Stress Concern Present (7/18/2024)    [employer] - Occupational Stress Questionnaire     Feeling of Stress : Only a little   Housing Stability: Unknown (7/18/2024)    Housing Stability Vital Sign     Unable to Pay for Housing in the Last Year: No     Review of patient's allergies indicates:   Allergen Reactions    Iodine Anaphylaxis    Pcn [penicillins]        Current Outpatient Medications:     valACYclovir (VALTREX) 1000 MG tablet, Take 1 tablet (1,000 mg total) by mouth once daily., Disp: 90 tablet, Rfl: 2    metFORMIN (GLUCOPHAGE) 500 MG tablet, Take 1 tablet (500 mg total) by mouth 2 (two) times daily., Disp: 180 tablet, Rfl: 1        Review of Systems   Constitutional:  Negative for diaphoresis and fever.   HENT:  Negative for trouble swallowing.    Respiratory:  Negative for cough and shortness of breath.    Cardiovascular:  Negative for chest pain and leg swelling.   Gastrointestinal:  Negative for abdominal pain and blood in stool.   Genitourinary:  Negative for difficulty urinating.   Musculoskeletal:  Negative for arthralgias, gait problem and joint swelling.   Skin:  Negative for pallor.   Neurological:  Positive for numbness. Negative for syncope and weakness.       Objective:        Vitals:    07/18/24 1349   BP: 120/80   BP Location: Left arm  "  Patient Position: Sitting   BP Method: Large (Manual)   Pulse: 90   Resp: 15   Temp: 97.7 °F (36.5 °C)   TempSrc: Temporal   SpO2: 99%   Weight: 74.8 kg (164 lb 12.8 oz)   Height: 5' 4" (1.626 m)       Body mass index is 28.29 kg/m².    Physical Exam  Constitutional:       General: She is not in acute distress.     Appearance: She is well-developed. She is not diaphoretic.   HENT:      Head: Normocephalic and atraumatic.      Right Ear: External ear normal.      Left Ear: External ear normal.   Eyes:      Conjunctiva/sclera: Conjunctivae normal.   Cardiovascular:      Rate and Rhythm: Normal rate and regular rhythm.      Heart sounds: Normal heart sounds.   Pulmonary:      Effort: Pulmonary effort is normal.      Breath sounds: Normal breath sounds.   Abdominal:      General: There is no distension.      Palpations: Abdomen is soft.   Musculoskeletal:      Right hand: No swelling, deformity, tenderness or bony tenderness. Normal range of motion. Normal strength. Decreased sensation. Normal capillary refill. Normal pulse.      Cervical back: Neck supple.      Right lower leg: No edema.      Left lower leg: No edema.   Skin:     General: Skin is warm and dry.      Nails: There is no clubbing.   Neurological:      General: No focal deficit present.      Mental Status: She is alert.   Psychiatric:         Behavior: Behavior normal.         Judgment: Judgment normal.         Assessment:     1. Annual physical exam    2. Pre-diabetes    3. PCOS (polycystic ovarian syndrome)    4. Scalp lesion    5. Thumb numbness           Plan:         1. Annual physical exam  - labs - fasting  - immunizations reviewed  - pap/gyn - utd  - CBC Auto Differential; Future  - Comprehensive Metabolic Panel; Future  - Hemoglobin A1C; Future  - Lipid Panel; Future  - TSH; Future  - T4, Free; Future    2. Pre-diabetes    - Hemoglobin A1C; Future    3. PCOS (polycystic ovarian syndrome)    - metFORMIN (GLUCOPHAGE) 500 MG tablet; Take 1 tablet " (500 mg total) by mouth 2 (two) times daily.  Dispense: 180 tablet; Refill: 1    4. Scalp lesion    - Ambulatory referral/consult to Dermatology; Future    5. Thumb numbness  - unclear etiology. No associated symptoms. Will CTM, she will notify clinic if any new symptoms or failure to resolve.            Unless there are intervening problems, Follow up in about 1 year (around 7/18/2025), or if symptoms worsen or fail to improve, for annual.      Patient note was created using MModal Dictation.  Any errors in syntax or even information may not have been identified and edited on initial review prior to signing this note.

## 2025-01-20 ENCOUNTER — PATIENT MESSAGE (OUTPATIENT)
Dept: ADMINISTRATIVE | Facility: HOSPITAL | Age: 35
End: 2025-01-20
Payer: COMMERCIAL

## 2025-01-27 DIAGNOSIS — E28.2 PCOS (POLYCYSTIC OVARIAN SYNDROME): ICD-10-CM

## 2025-01-28 RX ORDER — METFORMIN HYDROCHLORIDE 500 MG/1
500 TABLET ORAL 2 TIMES DAILY
Qty: 180 TABLET | Refills: 0 | Status: SHIPPED | OUTPATIENT
Start: 2025-01-28

## 2025-04-11 NOTE — PROGRESS NOTES
"CC: WELL WOMAN (age 26-39)     Patient Care Team:  Ignacia Landin MD as PCP - General (Internal Medicine)        HPI:  Patient is a 34 y.o. who presents for her well woman exam today.  History reviewed with patient.   Patient is without complaints or concerns today. No change in cycles. Pt would like myriad order again.     CONTRACEPTION:  none  GARDASIL: yes x 2  HX ABNL PAPS: none    REVIEW OF DATA/ HEALTH MAINTENANCE  LAST ANNUAL:   2024       Past Medical History:   Diagnosis Date    Anxiety and depression     Elevated DHEA     Elevated testosterone level in female     ref [facility]ology-[facility]    Folliculitis     History of UTI     HSV-2 infection 2023    genital    Menses, irregular     PCOS (polycystic ovarian syndrome)     Pre-diabetes     Sickle cell trait      SURGICAL HX:   has a past surgical history that includes  section.    SOCIAL HX:   reports that she has been smoking cigars. She has never been exposed to tobacco smoke. She has never used smokeless tobacco. She reports current alcohol use of about 4.0 standard drinks of alcohol per week. She reports that she does not use drugs.    Current Outpatient Medications   Medication Instructions    metFORMIN (GLUCOPHAGE) 500 mg, Oral, 2 times daily    valACYclovir (VALTREX) 1,000 mg, Oral, Daily     VITALS:  Blood pressure 117/79, height 5' 4" (1.626 m), weight 76.2 kg (168 lb).  Body mass index is 28.84 kg/m².     PHYSICAL EXAM-  APPEARANCE: Well appearing, in no acute distress.  CV/ PULM: no resp distress, normal resp effort  PSYCH: Normal mood and affect, cooperative  SKIN: No rashes, lesions, or abnormal bruising  ABD: Soft, no masses, tenderness, or distension  BREASTS: No skin changes,nipple dc. No palpable masses or tenderness  PELVIC:  VULVA: Normal female genitalia. No lesions  BLADDER: No suprapubic tenderness  VAGINA/ CVX:  No lesions, no d/c  UTERUS: mobile, non-tender  ADNEXA: No masses, tenderness, or " fullness.  ANUS/ PERINEUM: Normal tone.  No lesions.           *patient verbally consented for exam and female chaperone present for entire exam    ASSESSMENT and PLAN:  Encounter for well woman exam with routine gynecological exam  -     Liquid-based pap smear, screening    Family history of pancreatic cancer  -     BRCAnalysis w/ [facility]; Future; Expected date: 04/14/2025       FOLLOWUP:  1 yr for wwe or sooner prn    COUNSELING:  Patient was counseled today on recommendation for yearly pelvic exams, current Pap guidelines, self breast exams, contraception, and recommendations for annual screening mammograms at age 40. Encouraged patient to see her PCP for other health maintenance.

## 2025-04-14 ENCOUNTER — OFFICE VISIT (OUTPATIENT)
Dept: OBSTETRICS AND GYNECOLOGY | Facility: CLINIC | Age: 35
End: 2025-04-14
Payer: COMMERCIAL

## 2025-04-14 VITALS
SYSTOLIC BLOOD PRESSURE: 117 MMHG | BODY MASS INDEX: 28.68 KG/M2 | WEIGHT: 168 LBS | DIASTOLIC BLOOD PRESSURE: 79 MMHG | HEIGHT: 64 IN

## 2025-04-14 DIAGNOSIS — Z80.0 FAMILY HISTORY OF PANCREATIC CANCER: ICD-10-CM

## 2025-04-14 DIAGNOSIS — Z01.419 ENCOUNTER FOR WELL WOMAN EXAM WITH ROUTINE GYNECOLOGICAL EXAM: Primary | ICD-10-CM

## 2025-04-14 LAB — Lab: NORMAL

## 2025-04-14 PROCEDURE — 99395 PREV VISIT EST AGE 18-39: CPT | Mod: S$PBB,,, | Performed by: OBSTETRICS & GYNECOLOGY

## 2025-04-14 PROCEDURE — 3008F BODY MASS INDEX DOCD: CPT | Mod: CPTII,,, | Performed by: OBSTETRICS & GYNECOLOGY

## 2025-04-14 PROCEDURE — 1159F MED LIST DOCD IN RCRD: CPT | Mod: CPTII,,, | Performed by: OBSTETRICS & GYNECOLOGY

## 2025-04-14 PROCEDURE — 3078F DIAST BP <80 MM HG: CPT | Mod: CPTII,,, | Performed by: OBSTETRICS & GYNECOLOGY

## 2025-04-14 PROCEDURE — 3074F SYST BP LT 130 MM HG: CPT | Mod: CPTII,,, | Performed by: OBSTETRICS & GYNECOLOGY

## 2025-04-17 ENCOUNTER — RESULTS FOLLOW-UP (OUTPATIENT)
Dept: OBSTETRICS AND GYNECOLOGY | Facility: CLINIC | Age: 35
End: 2025-04-17

## 2025-04-17 LAB — Lab: NORMAL

## 2025-04-23 DIAGNOSIS — E28.2 PCOS (POLYCYSTIC OVARIAN SYNDROME): ICD-10-CM

## 2025-04-29 RX ORDER — METFORMIN HYDROCHLORIDE 500 MG/1
500 TABLET ORAL 2 TIMES DAILY
Qty: 180 TABLET | Refills: 0 | Status: SHIPPED | OUTPATIENT
Start: 2025-04-29

## 2025-07-23 DIAGNOSIS — E28.2 PCOS (POLYCYSTIC OVARIAN SYNDROME): ICD-10-CM

## 2025-07-23 RX ORDER — METFORMIN HYDROCHLORIDE 500 MG/1
500 TABLET ORAL 2 TIMES DAILY
Qty: 180 TABLET | Refills: 0 | Status: SHIPPED | OUTPATIENT
Start: 2025-07-23

## 2025-07-24 ENCOUNTER — PATIENT MESSAGE (OUTPATIENT)
Facility: CLINIC | Age: 35
End: 2025-07-24
Payer: COMMERCIAL

## 2025-07-24 DIAGNOSIS — E28.2 PCOS (POLYCYSTIC OVARIAN SYNDROME): ICD-10-CM

## 2025-07-24 RX ORDER — METFORMIN HYDROCHLORIDE 500 MG/1
500 TABLET ORAL 2 TIMES DAILY
Qty: 180 TABLET | Refills: 0 | OUTPATIENT
Start: 2025-07-24

## 2025-07-24 NOTE — TELEPHONE ENCOUNTER
No answer    Copied from CRM #[id number]. Topic: General Inquiry - Return Call  >> Jul 24, 2025 11:19 AM Dustin wrote:  .Type:  Patient Returning Call    Who Called: April   Who Left Message for Patient: no message was left   Does the patient know what this is regarding?: yes   Would the patient rather a call back or a response via [facility]?  Call back   Best Call Back Number: [phone] if no answer please respond in pt portal   Additional Information: